# Patient Record
Sex: FEMALE | Race: AMERICAN INDIAN OR ALASKA NATIVE
[De-identification: names, ages, dates, MRNs, and addresses within clinical notes are randomized per-mention and may not be internally consistent; named-entity substitution may affect disease eponyms.]

---

## 2017-01-15 NOTE — XRAY REPORT
AP CHEST:



HISTORY: Cough



AP view of the chest demonstrates a normal mediastinal and

cardiac contour with clear lungs and normal bony and soft tissue

structures.



IMPRESSION:

Unremarkable AP chest. No significant change since 4/14/16.

## 2017-01-15 NOTE — EMERGENCY DEPARTMENT REPORT
ED General Adult HPI





- General


Chief complaint: Adult Asthma


Stated complaint: PEGGY/ASTHMA


Time Seen by Provider: 01/15/17 06:51


Source: patient


Mode of arrival: Wheelchair


Limitations: No Limitations





- History of Present Illness


Initial comments: 


She presents for persistent wheezing.  She states she has run out of her 

medicine.  She also states that she was given a prescription for a Z-Giorgio on a 

recent emergency department visit.  She does not smoke.  She's had no fever or 

chills.  She states her chest is occasionally sore with coughing.  She denies 

pleuritic pain.  She denies hemoptysis.  She states that she does produce 

yellow sputum.  She's had no leg swelling or pain.





-: Gradual, week(s)


Location: chest (only on cough)


Radiation: non-radiation


Quality: other


Consistency: intermittent, now resolved


Improves with: none


Worsens with: other (only on cough)


Associated Symptoms: denies other symptoms





- Related Data


 Home Medications











 Medication  Instructions  Recorded  Confirmed  Last Taken


 


Fluticasone/Salmeterol [Advair 1 puff IH BID 12/19/15 01/15/17 04/13/16





Diskus 500-50 mcg]    


 


Ipratropium Bromide [Atrovent Hfa] 12.9 gm IH BID PRN 12/19/15 01/15/17 04/13/16


 


Montelukast [Singulair] 10 mg PO QPM 12/19/15 01/15/17 04/13/16








 Previous Rx's











 Medication  Instructions  Recorded  Last Taken  Type


 


ALBUTEROL Inhaler [ProAir HFA 2 puff IH QID PRN #1 inha 01/30/16 04/13/16 Rx





Inhaler]    


 


Ipratropium [Atrovent NEB] 0.5 mg IH Q4HR PRN #20 nebu 04/09/16 04/13/16 Rx


 


Nebulizer Accessories [Aeroneb Go] 1 each  QID #1 each 04/09/16 04/13/16 Rx


 


Nebulizer [Minicomp Compressor 1 each  QID #1 each 04/09/16 04/13/16 Rx





Nebulizer]    


 


ALBUTEROL Inhaler [Proair] 2 puff IH QID PRN #1 inhalation 01/15/17 Unknown Rx


 


Albuterol Sulfate [Albuterol 0.63% 0.63 mg IH TID PRN #3 ml 01/15/17 Unknown Rx





NEBS]    


 


Fluticasone/Salmeterol [Advair 1 puff IH BID #1 disk.w.dev 01/15/17 Unknown Rx





Diskus 250-50 mcg]    


 


Sulfamethoxazole/Trimethoprim 1 each PO BID #10 tablet 01/15/17 Unknown Rx





[Bactrim DS TAB]    


 


predniSONE [Deltasone] 40 mg PO QDAY #14 tab 01/15/17 Unknown Rx











 Allergies











Allergy/AdvReac Type Severity Reaction Status Date / Time


 


methylprednisolone sodium Allergy  Hives Verified 04/14/16 07:55





succinate     





[From Solu-Medrol]     


 


moxifloxacin HCl Allergy  Hives Verified 04/14/16 07:55





[From Avelox]     














ED Review of Systems


ROS: 


Stated complaint: PEGGY/ASTHMA


Other details as noted in HPI





Constitutional: denies: chills, fever


Eyes: denies: eye pain, eye discharge, vision change


ENT: denies: ear pain, throat pain


Respiratory: cough, wheezing.  denies: shortness of breath


Cardiovascular: as per HPI, chest pain.  denies: palpitations


Endocrine: no symptoms reported


Gastrointestinal: denies: abdominal pain, nausea, diarrhea


Genitourinary: denies: urgency, dysuria, discharge


Musculoskeletal: denies: back pain, joint swelling, arthralgia


Skin: denies: rash, lesions


Neurological: denies: headache, weakness, paresthesias


Psychiatric: denies: anxiety, depression


Hematological/Lymphatic: denies: easy bleeding, easy bruising





ED Past Medical Hx





- Past Medical History


Previous Medical History?: Yes


Hx Hypertension: No


Hx Congestive Heart Failure: No


Hx Diabetes: No


Hx Sickle Cell Disease: No


Hx Arthritis:  (intubated x 4)


Hx Seizures: No


Hx Asthma: Yes


Hx COPD: No


Hx HIV: No


Additional medical history: OBESITY.  HERNIA X 2





- Surgical History


Past Surgical History?: Yes


Hx Cholecystectomy: Yes


Additional Surgical History: HERNIA SURGERY; C SECTION





- Social History


Smoking Status: Unknown if ever smoked


Substance Use Type: None





- Medications


Home Medications: 


 Home Medications











 Medication  Instructions  Recorded  Confirmed  Last Taken  Type


 


Fluticasone/Salmeterol [Advair 1 puff IH BID 12/19/15 01/15/17 04/13/16 History





Diskus 500-50 mcg]     


 


Ipratropium Bromide [Atrovent Hfa] 12.9 gm IH BID PRN 12/19/15 01/15/17 04/13/

16 History


 


Montelukast [Singulair] 10 mg PO QPM 12/19/15 01/15/17 04/13/16 History


 


ALBUTEROL Inhaler [ProAir HFA 2 puff IH QID PRN #1 inha 01/30/16 01/15/17 04/13/

16 Rx





Inhaler]     


 


Ipratropium [Atrovent NEB] 0.5 mg IH Q4HR PRN #20 nebu 04/09/16 01/15/17 04/13/

16 Rx


 


Nebulizer Accessories [Aeroneb Go] 1 each  QID #1 each 04/09/16 01/15/17 04/13

/16 Rx


 


Nebulizer [Minicomp Compressor 1 each  QID #1 each 04/09/16 01/15/17 04/13/16 

Rx





Nebulizer]     


 


ALBUTEROL Inhaler [Proair] 2 puff IH QID PRN #1 inhalation 01/15/17  Unknown Rx


 


Albuterol Sulfate [Albuterol 0.63% 0.63 mg IH TID PRN #3 ml 01/15/17  Unknown Rx





NEBS]     


 


Fluticasone/Salmeterol [Advair 1 puff IH BID #1 disk.w.dev 01/15/17  Unknown Rx





Diskus 250-50 mcg]     


 


Sulfamethoxazole/Trimethoprim 1 each PO BID #10 tablet 01/15/17  Unknown Rx





[Bactrim DS TAB]     


 


predniSONE [Deltasone] 40 mg PO QDAY #14 tab 01/15/17  Unknown Rx














ED Physical Exam





- General


Limitations: No Limitations


General appearance: alert, in no apparent distress





- Head


Head exam: Present: atraumatic, normocephalic





- Eye


Eye exam: Present: normal appearance.  Absent: scleral icterus





- ENT


ENT exam: Present: mucous membranes moist





- Neck


Neck exam: Present: normal inspection





- Respiratory


Respiratory exam: Present: normal lung sounds bilaterally.  Absent: respiratory 

distress





- Cardiovascular


Cardiovascular Exam: Present: regular rate, normal rhythm.  Absent: systolic 

murmur, diastolic murmur, rubs, gallop





- GI/Abdominal


GI/Abdominal exam: Present: soft, normal bowel sounds





- Extremities Exam


Extremities exam: Present: normal inspection





- Back Exam


Back exam: Present: normal inspection





- Neurological Exam


Neurological exam: Present: alert, oriented X3





- Psychiatric


Psychiatric exam: Present: normal affect, normal mood





- Skin


Skin exam: Present: warm, dry, intact, normal color.  Absent: rash





ED Course





 Vital Signs











  01/15/17 01/15/17 01/15/17





  05:42 06:08 06:22


 


Temperature 99.5 F 99.5 F 


 


Pulse Rate 99 H 90 


 


Pulse Rate [   





Bilateral]   


 


Respiratory 26 H 22 22





Rate   


 


Respiratory   





Rate [Bilateral   





]   


 


Blood Pressure 138/95  


 


Blood Pressure  146/88 





[Right]   


 


O2 Sat by Pulse 100 96 96





Oximetry   














  01/15/17 01/15/17 01/15/17





  07:10 07:25 07:28


 


Temperature   


 


Pulse Rate   


 


Pulse Rate [ 91 H 88 84





Bilateral]   


 


Respiratory   





Rate   


 


Respiratory 20 19 18





Rate [Bilateral   





]   


 


Blood Pressure   


 


Blood Pressure   





[Right]   


 


O2 Sat by Pulse   





Oximetry   














  01/15/17





  07:35


 


Temperature 


 


Pulse Rate 


 


Pulse Rate [ 91 H





Bilateral] 


 


Respiratory 





Rate 


 


Respiratory 18





Rate [Bilateral 





] 


 


Blood Pressure 


 


Blood Pressure 





[Right] 


 


O2 Sat by Pulse 





Oximetry 














- Reevaluation(s)


Reevaluation #1: 


Given nebs prior to my encounter.  The patient had no recurrent wheezing.  She 

has some slight residual rhonchi.  However her sat was good.  She desired 

discharge and was released in stable condition.  We over her chronic management 

and return criteria discussed.


01/15/17 10:19








ED Medical Decision Making





- Lab Data


See Accu-Chek result








- Radiology Data


interpreted by me: 


Chest x-ray no acute process





Critical care attestation.: 


If time is entered above; I have spent that time in minutes in the direct care 

of this critically ill patient, excluding procedure time.








ED Disposition


Clinical Impression: 


 Asthma exacerbation





Chronic bronchitis


Qualifiers:


 Chronic bronchitis type: unspecified Qualified Code(s): J42 - Unspecified 

chronic bronchitis


Disposition: DISCHARGED TO HOME OR SELFCARE


Is pt being admited?: No


Does the pt Need Aspirin: No


Condition: Stable


Instructions:  Asthma (ED), Chronic Bronchitis (ED)


Prescriptions: 


ALBUTEROL Inhaler [Proair] 2 puff IH QID PRN #1 inhalation


 PRN Reason: Shortness Of Breath


Albuterol Sulfate [Albuterol 0.63% NEBS] 0.63 mg IH TID PRN #3 ml


 PRN Reason: Wheezing


Fluticasone/Salmeterol [Advair Diskus 250-50 mcg] 1 puff IH BID #1 disk.w.dev


Sulfamethoxazole/Trimethoprim [Bactrim DS TAB] 1 each PO BID #10 tablet


predniSONE [Deltasone] 40 mg PO QDAY #14 tab


Referrals: 


[facility] [Provider Group] - 3-5 Days


PRIMARY CARE,MD [Primary Care Provider] - 2-3 Days


Time of Disposition: 10:21

## 2018-04-08 ENCOUNTER — HOSPITAL ENCOUNTER (INPATIENT)
Dept: HOSPITAL 5 - ED | Age: 43
LOS: 4 days | Discharge: HOME | DRG: 353 | End: 2018-04-12
Attending: INTERNAL MEDICINE | Admitting: INTERNAL MEDICINE
Payer: COMMERCIAL

## 2018-04-08 DIAGNOSIS — R65.10: ICD-10-CM

## 2018-04-08 DIAGNOSIS — Z90.49: ICD-10-CM

## 2018-04-08 DIAGNOSIS — E88.81: ICD-10-CM

## 2018-04-08 DIAGNOSIS — J45.901: ICD-10-CM

## 2018-04-08 DIAGNOSIS — K43.6: Primary | ICD-10-CM

## 2018-04-08 DIAGNOSIS — Z82.49: ICD-10-CM

## 2018-04-08 DIAGNOSIS — E66.9: ICD-10-CM

## 2018-04-08 DIAGNOSIS — K21.9: ICD-10-CM

## 2018-04-08 DIAGNOSIS — Z79.899: ICD-10-CM

## 2018-04-08 DIAGNOSIS — Z91.19: ICD-10-CM

## 2018-04-08 DIAGNOSIS — D64.9: ICD-10-CM

## 2018-04-08 DIAGNOSIS — K42.0: ICD-10-CM

## 2018-04-08 DIAGNOSIS — J96.00: ICD-10-CM

## 2018-04-08 DIAGNOSIS — M19.90: ICD-10-CM

## 2018-04-08 LAB
ALBUMIN SERPL-MCNC: 3.5 G/DL (ref 3.9–5)
ALT SERPL-CCNC: 18 UNITS/L (ref 7–56)
BACTERIA #/AREA URNS HPF: (no result) /HPF
BASOPHILS # (AUTO): 0 K/MM3 (ref 0–0.1)
BASOPHILS NFR BLD AUTO: 0.1 % (ref 0–1.8)
BILIRUB UR QL STRIP: (no result)
BLOOD UR QL VISUAL: (no result)
BUN SERPL-MCNC: 9 MG/DL (ref 7–17)
BUN/CREAT SERPL: 13 %
CALCIUM SERPL-MCNC: 8.3 MG/DL (ref 8.4–10.2)
EOSINOPHIL # BLD AUTO: 0.5 K/MM3 (ref 0–0.4)
EOSINOPHIL NFR BLD AUTO: 4.4 % (ref 0–4.3)
HCT VFR BLD CALC: 31.7 % (ref 30.3–42.9)
HEMOLYSIS INDEX: 0
HGB BLD-MCNC: 9.8 GM/DL (ref 10.1–14.3)
LIPASE SERPL-CCNC: 11 UNITS/L (ref 13–60)
LYMPHOCYTES # BLD AUTO: 1.1 K/MM3 (ref 1.2–5.4)
LYMPHOCYTES NFR BLD AUTO: 9.8 % (ref 13.4–35)
MCH RBC QN AUTO: 22 PG (ref 28–32)
MCHC RBC AUTO-ENTMCNC: 31 % (ref 30–34)
MCV RBC AUTO: 70 FL (ref 79–97)
MONOCYTES # (AUTO): 0.7 K/MM3 (ref 0–0.8)
MONOCYTES % (AUTO): 6.5 % (ref 0–7.3)
PH UR STRIP: 8 [PH] (ref 5–7)
PLATELET # BLD: 147 K/MM3 (ref 140–440)
PROT UR STRIP-MCNC: (no result) MG/DL
RBC # BLD AUTO: 4.51 M/MM3 (ref 3.65–5.03)
RBC #/AREA URNS HPF: 2 /HPF (ref 0–6)
UROBILINOGEN UR-MCNC: < 2 MG/DL (ref ?–2)
WBC #/AREA URNS HPF: 2 /HPF (ref 0–6)

## 2018-04-08 PROCEDURE — 96375 TX/PRO/DX INJ NEW DRUG ADDON: CPT

## 2018-04-08 PROCEDURE — C1781 MESH (IMPLANTABLE): HCPCS

## 2018-04-08 PROCEDURE — 74176 CT ABD & PELVIS W/O CONTRAST: CPT

## 2018-04-08 PROCEDURE — 36415 COLL VENOUS BLD VENIPUNCTURE: CPT

## 2018-04-08 PROCEDURE — 83690 ASSAY OF LIPASE: CPT

## 2018-04-08 PROCEDURE — 94640 AIRWAY INHALATION TREATMENT: CPT

## 2018-04-08 PROCEDURE — 80048 BASIC METABOLIC PNL TOTAL CA: CPT

## 2018-04-08 PROCEDURE — 85025 COMPLETE CBC W/AUTO DIFF WBC: CPT

## 2018-04-08 PROCEDURE — 80053 COMPREHEN METABOLIC PANEL: CPT

## 2018-04-08 PROCEDURE — 87040 BLOOD CULTURE FOR BACTERIA: CPT

## 2018-04-08 PROCEDURE — 96374 THER/PROPH/DIAG INJ IV PUSH: CPT

## 2018-04-08 PROCEDURE — 94760 N-INVAS EAR/PLS OXIMETRY 1: CPT

## 2018-04-08 PROCEDURE — 81001 URINALYSIS AUTO W/SCOPE: CPT

## 2018-04-08 PROCEDURE — C9113 INJ PANTOPRAZOLE SODIUM, VIA: HCPCS

## 2018-04-08 PROCEDURE — 71045 X-RAY EXAM CHEST 1 VIEW: CPT

## 2018-04-08 RX ADMIN — MONTELUKAST SCH MG: 10 TABLET, FILM COATED ORAL at 18:40

## 2018-04-08 RX ADMIN — BUDESONIDE SCH MG: 0.5 INHALANT RESPIRATORY (INHALATION) at 20:07

## 2018-04-08 RX ADMIN — OXYCODONE AND ACETAMINOPHEN PRN TAB: 5; 325 TABLET ORAL at 18:47

## 2018-04-08 RX ADMIN — ENOXAPARIN SODIUM SCH MG: 100 INJECTION SUBCUTANEOUS at 18:14

## 2018-04-08 NOTE — CAT SCAN REPORT
FINAL REPORT



PROCEDURE:  CT ABDOMEN PELVIS WO CON



TECHNIQUE:  Computerized axial tomography of the abdomen and

pelvis was performed without intravenous contrast. This study is

performed without intravascular contrast material and its

sensitivity for abdominal and pelvic pathology, including

neoplasms, inflammation, abscess, free fluid, thrombosis,

arterial dissection and infarction, is reduced compared with a

contrast enhanced study. 



HISTORY:  abd pain 



COMPARISON:  No prior studies are available for comparison.



FINDINGS:  

Lower Lung fields: There are minimal linear bands of atelectasis

visualized. Lung bases otherwise are.



Upper Abdomen: Moderate size hiatal hernia is present. The liver

showed no focal abnormalities. The gallbladder is surgically

absent. The adrenal glands, the pancreas and the spleen are

unremarkable. 



Kidneys, Ureters and Urinary bladder: There are 2 nonobstructing

calculi in the upper half of the right kidney, 1 measures 1.4 x

1.0 centimeter the other 4.7 millimeters. The right kidney and

right ureter otherwise are unremarkable. Left kidney left ureter

and urinary bladder show no focal abnormalities.



Retroperitoneum: Atherosclerotic changes are seen in the

abdominal aorta.  No aneurysm is visualized.



Nonspecific subcentimeter lymph nodes are seen in the

retroperitoneum. No pathologically enlarged lymph nodes are

identified. 



Bowel: Mild diverticulosis is seen in the sigmoid colon without

evidence of diverticulitis. Mild diverticulosis also seen in the

transverse colon without evidence of diverticulitis. The appendix

is not visualized. No evidence of bowel obstruction ascites or

free intraperitoneal gas. Anterior pelvic wall hernia visualized

with at least 2 orifice both containing adipose tissue and

portions of loops of small bowel.



Reproductive organs: Uterus is deviated to the right of midline

otherwise unremarkable. No abnormal adnexal masses are

identified.



Other: No acute bony abnormalities are seen.



IMPRESSION:  

Moderate size hiatal hernia present.



Moderate-sized anterior pelvic wall hernia with at least 2

orifice containing adipose tissue and loops of small bowel

without obstruction..



Prior cholecystectomy. 



Nonobstructing renal calculi right kidney as described.

## 2018-04-08 NOTE — EMERGENCY DEPARTMENT REPORT
ED Abdominal Pain HPI





- General


Chief Complaint: Abdominal Pain


Stated Complaint: ABD PAIN


Time Seen by Provider: 04/08/18 12:55


Source: EMS


Mode of arrival: Stretcher


Limitations: No Limitations





- History of Present Illness


Initial Comments: 





Patient is a 43-year-old female presents to emergency room with nausea vomiting 

diarrhea and abdominal pain 1 day.  Patient states the pain is worsening.  

Patient states that the abdominal pain is umbilical and radiates to her left 

and right side of her abdomen.  Patient states she feels feverish at times. 

patient denies chest pain or shortness of breath.  Patient complains of a 

worsening cough that started 2 days ago.  Patient states she has asthma and her 

asthma is worsening.  Patient states the abdominal pain is worse with movement 

and palpation.  Patient states the abdominal pain is better with rest and lying 

still.  Patient states that her cough is better with her asthma medications and 

sitting up upright.  Patient is currently at anchor on a 1013 and has her 

sitter at bedside


MD Complaint: abdominal pain


-: Sudden


Location: periumbilical


Severity scale (0 -10): 10


Quality: cramping, stabbing


Consistency: constant


Improves With: rest


Worsens With: movement


Associated Symptoms: denies other symptoms, nausea, vomiting, diarrhea.  denies

: fever, chills, constipation, dysuria, hematemesis, hematochezia, melena, 

hematuria, anorexia, syncope





- Related Data


 Home Medications











 Medication  Instructions  Recorded  Confirmed  Last Taken


 


ALPRAZolam [Xanax] 1 mg PO BID 04/08/18 04/08/18 Unknown


 


ARIPiprazole [Abilify] 5 mg PO BID 04/08/18 04/08/18 Unknown


 


Furosemide [Lasix] 20 mg PO QDAY 04/08/18 04/08/18 Unknown


 


HYDROcodone/APAP 5-325 [Norco 1 each PO TID 04/08/18 04/08/18 Unknown





5/325]    


 


Nitrofurantoin Macrocrystal 100 mg PO BID 04/08/18 04/08/18 Unknown





[Macrodantin]    


 


Omeprazole 40 mg PO QAM 04/08/18 04/08/18 Unknown


 


buPROPion XL [Wellbutrin XL] 150 mg PO QAM 04/08/18 04/08/18 Unknown








 Previous Rx's











 Medication  Instructions  Recorded  Last Taken  Type


 


Fluticasone/Salmeterol [Advair 1 puff IH BID #1 disk.w.dev 01/15/17 Unknown Rx





Diskus 250-50 mcg]    











 Allergies











Allergy/AdvReac Type Severity Reaction Status Date / Time


 


methylprednisolone sodium Allergy  Hives Verified 04/14/16 07:55





succinate     





[From Solu-Medrol]     


 


moxifloxacin HCl Allergy  Hives Verified 04/14/16 07:55





[From Avelox]     














ED Review of Systems


ROS: 


Stated complaint: ABD PAIN


Other details as noted in HPI





Comment: All other systems reviewed and negative


Constitutional: fever.  denies: chills


Eyes: denies: eye pain, eye discharge, vision change


ENT: denies: ear pain, throat pain


Respiratory: cough, wheezing.  denies: shortness of breath


Cardiovascular: denies: chest pain, palpitations


Endocrine: no symptoms reported


Gastrointestinal: abdominal pain, nausea, diarrhea.  denies: constipation, 

hematemesis, melena


Genitourinary: denies: urgency, dysuria, discharge


Musculoskeletal: denies: back pain, joint swelling, arthralgia


Skin: denies: rash, lesions


Neurological: denies: headache, weakness, paresthesias


Psychiatric: denies: anxiety, depression


Hematological/Lymphatic: denies: easy bleeding, easy bruising





ED Past Medical Hx





- Past Medical History


Previous Medical History?: Yes


Hx Hypertension: No


Hx Congestive Heart Failure: No


Hx Diabetes: No


Hx Sickle Cell Disease: No


Hx Arthritis:  (intubated x 4)


Hx Seizures: No


Hx Asthma: Yes


Hx COPD: No


Hx HIV: No


Additional medical history: OBESITY.  HERNIA X 2





- Surgical History


Past Surgical History?: Yes


Hx Cholecystectomy: Yes


Additional Surgical History: HERNIA SURGERY; C SECTION





- Family History


Family history: hypertension





- Social History


Smoking Status: Never Smoker


Substance Use Type: Alcohol





- Medications


Home Medications: 


 Home Medications











 Medication  Instructions  Recorded  Confirmed  Last Taken  Type


 


Fluticasone/Salmeterol [Advair 1 puff IH BID #1 disk.w.dev 01/15/17 04/08/18 

Unknown Rx





Diskus 250-50 mcg]     


 


ALPRAZolam [Xanax] 1 mg PO BID 04/08/18 04/08/18 Unknown History


 


ARIPiprazole [Abilify] 5 mg PO BID 04/08/18 04/08/18 Unknown History


 


Furosemide [Lasix] 20 mg PO QDAY 04/08/18 04/08/18 Unknown History


 


HYDROcodone/APAP 5-325 [Norco 1 each PO TID 04/08/18 04/08/18 Unknown History





5/325]     


 


Nitrofurantoin Macrocrystal 100 mg PO BID 04/08/18 04/08/18 Unknown History





[Macrodantin]     


 


Omeprazole 40 mg PO QAM 04/08/18 04/08/18 Unknown History


 


buPROPion XL [Wellbutrin XL] 150 mg PO QAM 04/08/18 04/08/18 Unknown History














ED Physical Exam





- General


Limitations: No Limitations


General appearance: alert, in no apparent distress





- Head


Head exam: Present: atraumatic, normocephalic





- Eye


Eye exam: Present: normal appearance





- ENT


ENT exam: Present: mucous membranes moist





- Neck


Neck exam: Present: normal inspection





- Respiratory


Respiratory exam: Present: normal lung sounds bilaterally.  Absent: respiratory 

distress





- Cardiovascular


Cardiovascular Exam: Present: regular rate, normal rhythm.  Absent: systolic 

murmur, diastolic murmur, rubs, gallop





- GI/Abdominal


GI/Abdominal exam: Present: soft, tenderness (umbilical and periumbilical 

tenderness to palpation. ), normal bowel sounds, hernia (umbilical hernia noted 

and not reducible and extremely tender to palpation)





- Extremities Exam


Extremities exam: Present: normal inspection





- Back Exam


Back exam: Present: normal inspection





- Neurological Exam


Neurological exam: Present: alert, oriented X3





- Psychiatric


Psychiatric exam: Present: normal affect, normal mood





- Skin


Skin exam: Present: warm, dry, intact, normal color.  Absent: rash





ED Course


 Vital Signs











  04/08/18 04/08/18 04/08/18





  12:29 12:32 12:45


 


Temperature 99.3 F  


 


Pulse Rate 97 H  


 


Respiratory   





Rate   


 


Blood Pressure 126/64 123/74 114/72


 


O2 Sat by Pulse 96  96





Oximetry   














  04/08/18 04/08/18 04/08/18





  13:00 13:15 13:31


 


Temperature   


 


Pulse Rate   92 H


 


Respiratory   15





Rate   


 


Blood Pressure 110/72 107/69 114/72


 


O2 Sat by Pulse 92 93 95





Oximetry   














  04/08/18 04/08/18 04/08/18





  13:45 13:55 14:00


 


Temperature   


 


Pulse Rate 94 H  89


 


Respiratory 24 33 H 32 H





Rate   


 


Blood Pressure 114/72  103/57


 


O2 Sat by Pulse 96 98 93





Oximetry   














  04/08/18 04/08/18 04/08/18





  15:01 16:00 16:42


 


Temperature   


 


Pulse Rate   84


 


Respiratory   





Rate   


 


Blood Pressure 103/57 108/79 


 


O2 Sat by Pulse 98 95 





Oximetry   














  04/08/18 04/08/18





  17:00 18:00


 


Temperature  


 


Pulse Rate  


 


Respiratory  





Rate  


 


Blood Pressure 102/48 116/74


 


O2 Sat by Pulse 87 98





Oximetry  














- Reevaluation(s)


Reevaluation #1: 


Dr Sexton consulted for non-reducible umbilica hernia and abd pain. Dr Sexton 

recommends admission to the hospitalist and have the hospitalist consult her 

for evaluation and treatment.


04/08/18 15:34





Reevaluation #2: 


Hospitalist consulted for admission.  All results discussed with the patient 

and patient agree with plan of care.


04/08/18 15:37











Reevaluation #3: 


1013 signed due to the fact that the patient is currently at a psych facility 

for suicidal ideations and treatment


04/08/18 17:00








ED Medical Decision Making





- Lab Data


Result diagrams: 


 04/08/18 13:05





 04/08/18 13:05


Critical care attestation.: 


If time is entered above; I have spent that time in minutes in the direct care 

of this critically ill patient, excluding procedure time.





Critical Care Time: 


45 minutes spent for critical care time








ED Disposition


Clinical Impression: 


 Incarcerated umbilical hernia, Anemia, Abdominal pain, Cough, Asthma 

exacerbation





Disposition: [id number] OP ADMIT IP TO THIS HOSP


Is pt being admited?: Yes


Does the pt Need Aspirin: No


Time of Disposition: 15:45

## 2018-04-08 NOTE — XRAY REPORT
FINAL REPORT



EXAM:  XR CHEST 1V AP



HISTORY:  cough 



TECHNIQUE:  upright single view chest



PRIORS:  None.



FINDINGS:  

Cardiac and mediastinal contours are unremarkable.  No focal

pulmonary infiltrate is identified.  No pleural fluid collection

seen. Pulmonary vasculature is unremarkable. 



IMPRESSION:  

Negative single-view chest

## 2018-04-08 NOTE — HISTORY AND PHYSICAL REPORT
History of Present Illness


Chief complaint: 





My stomach hurts





History of present illness: 


44 YO Female with Asthma, Obesity Hypoventillation Syndrome presents to ED for 

evaluation. Pt states that she has experienced Abdominal pain for the past 1 

week, with worsening symptoms over the past 1 day. Pt acknowledges Nausea, 

Vomiting, and multiple episodes of loose stools with bloody bowel movements as 

well as a productive cough of yellow sputum. Pt denies BRBPR, CP, Palpitations, 

Syncope, Productive cough, unintentional weight loss, or night sweats. 


Pt seen and evaluated in ED and found to have incarcerated ventral hernia 

complicated by SIRS, as well as respiratory failure secondary to obesity 

hypoventillation. Surgery team consulted in ED. Pt admitted to medical floor. 





Past History


Past Surgical History: cholecystectomy, , hernia repair


Social history: single.  denies: smoking, alcohol abuse, prescription drug abuse


Family history: hypertension





Medications and Allergies


 Allergies











Allergy/AdvReac Type Severity Reaction Status Date / Time


 


methylprednisolone sodium Allergy  Hives Verified 16 07:55





succinate     





[From Solu-Medrol]     


 


moxifloxacin HCl Allergy  Hives Verified 16 07:55





[From Avelox]     











 Home Medications











 Medication  Instructions  Recorded  Confirmed  Last Taken  Type


 


Fluticasone/Salmeterol [Advair 1 puff IH BID #1 disk.w.dev 01/15/17 04/08/18 

Unknown Rx





Diskus 250-50 mcg]     


 


ALPRAZolam [Xanax] 1 mg PO BID 18 Unknown History


 


ARIPiprazole [Abilify] 5 mg PO BID 18 Unknown History


 


Furosemide [Lasix] 20 mg PO QDAY 18 Unknown History


 


HYDROcodone/APAP 5-325 [Norco 1 each PO TID 18 Unknown History





5/325]     


 


Nitrofurantoin Macrocrystal 100 mg PO BID 18 Unknown History





[Macrodantin]     


 


Omeprazole 40 mg PO QAM 18 Unknown History


 


buPROPion XL [Wellbutrin XL] 150 mg PO QAM 18 Unknown History














Review of Systems


Constitutional: fever, no weight loss, no weight gain, no chills, no sweats


Ears, nose, mouth and throat: no ear pain, no ear discharge, no tinnitis, no 

decreased hearing, no nose pain, no nasal congestion, no nasal discharge


Breasts: no change in shape, no swelling, no mass


Cardiovascular: no chest pain, no orthopnea, no palpitations, no rapid/

irregular heart beat


Respiratory: cough with sputum, shortness of breath


Gastrointestinal: abdominal pain, nausea, vomiting, diarrhea, BRBPR, no melena, 

no hematochezia, no loss of appetite, no early satiety


Genitourinary Female: no dysmenorrhea, no pelvic pain, no menorrhagia, no 

dysuria, no urinary frequency


Rectal: no pain, no incontinence, no bleeding


Musculoskeletal: no neck stiffness, no neck pain, no shooting arm pain, no arm 

numbness/tingling, no low back pain, no shooting leg pain, no leg numbness/

tingling, no redness of joints


Integumentary: no rash, no pruritis, no redness, no sores, no wounds, no 

jaundice


Neurological: no head injury, no transient paralysis, no paralysis, no weakness

, no parathesias, no numbness, no tingling, no seizures, no syncope


Psychiatric: no anxiety, no memory loss, no change in sleep habits, no sleep 

disturbances, no insomnia, no hypersomnia, no change in appetite, no change in 

libido, no suicidal ideation


Endocrine: no cold intolerance, no heat intolerance, no polyphagia, no 

excessive thirst, no polydipsia, no polyuria, no nocturia, no excessive sweating


Hematologic/Lymphatic: no easy bruising, no easy bleeding, no lymphadenopathy, 

no lymphedema


Allergic/Immunologic: no urticaria, no allergic rhinitis, no wheezing, no 

persistent infections, no anaphylaxis





Exam





- Constitutional


Vitals: 


 











Temp Pulse Resp BP Pulse Ox


 


 99.3 F   89   32 H  103/57   93 


 


 18 12:29  18 14:00  18 14:00  18 14:00  18 14:00











General appearance: Present: mild distress, obese





- EENT


Eyes: Present: PERRL


ENT: hearing intact, clear oral mucosa





- Neck


Neck: Present: supple, normal ROM





- Respiratory


Respiratory effort: normal


Respiratory: bilateral: diminished, wheezing





- Cardiovascular


Heart Sounds: Present: S1 & S2.  Absent: rub, click





- Extremities


Extremities: pulses symmetrical, No edema


Peripheral Pulses: within normal limits





- Abdominal


General gastrointestinal: Present: soft, tender, non-distended


Localized gastrointestinal: tender: epigastric periumbilical





- Rectal


Rectal Exam: normal exam-external/orifice





- Integumentary


Integumentary: Present: clear, warm, dry





- Musculoskeletal


Musculoskeletal: gait normal, strength equal bilaterally





- Psychiatric


Psychiatric: appropriate mood/affect, intact judgment & insight





- Neurologic


Neurologic: CNII-XII intact, moves all extremities





Results





- Labs


CBC & Chem 7: 


 18 13:05





 18 13:05


Labs: 


 Abnormal lab results











  18 Range/Units





  13:05 13:05 13:16 


 


WBC  11.3 H    (4.5-11.0)  K/mm3


 


Hgb  9.8 L    (10.1-14.3)  gm/dl


 


MCV  70 L    (79-97)  fl


 


MCH  22 L    (28-32)  pg


 


RDW  16.4 H    (13.2-15.2)  %


 


Lymph % (Auto)  9.8 L    (13.4-35.0)  %


 


Eos % (Auto)  4.4 H    (0.0-4.3)  %


 


Lymph #  1.1 L    (1.2-5.4)  K/mm3


 


Eos #  0.5 H    (0.0-0.4)  K/mm3


 


Seg Neutrophils %  79.2 H    (40.0-70.0)  %


 


Seg Neutrophils #  9.0 H    (1.8-7.7)  K/mm3


 


Chloride   95.2 L   ()  mmol/L


 


Glucose   133 H   ()  mg/dL


 


Calcium   8.3 L   (8.4-10.2)  mg/dL


 


Albumin   3.5 L   (3.9-5)  g/dL


 


Lipase   11 L   (13-60)  units/L


 


Urine pH    8.0 H  (5.0-7.0)  














Assessment and Plan





- Patient Problems


(1) SIRS (systemic inflammatory response syndrome)


Current Visit: Yes   Status: Acute   


Plan to address problem: 


IV antibiotics, Chest X ray, UTI, CT abdomen pelvis, supportive care. 








(2) Umbilical hernia, incarcerated


Current Visit: Yes   Status: Acute   


Plan to address problem: 


Surgery consulted, NPO after midnight, possible surgical intervention in AM. 








(3) Respiratory failure


Current Visit: Yes   Status: Acute   


Qualifiers: 


   Chronicity: acute   Respiratory failure complication: hypoxia   Qualified 

Code(s): J96.01 - Acute respiratory failure with hypoxia   


Plan to address problem: 


Suplemental oxygen, nebulizer therapy, aspiration precautions, incentive 

spirometry, NIPPV as clinically indicated. 








(4) Asthma exacerbation


Current Visit: Yes   Status: Acute   


Plan to address problem: 


supplemental oxygen, nebulizer therapy, inhaled steroids, IV antibiotics, pulse 

oximetry








(5) DVT prophylaxis


Current Visit: Yes   Status: Acute   


Plan to address problem: 


SCD to BLE while in bed,

## 2018-04-08 NOTE — CONSULTATION
History of Present Illness


Consult date: 18


Chief complaint: 





abd pain, hernia





- History of present illness


History of present illness: 





44 yo F with hx of asthma, COPD, obesity presents to ER from psychiatric 

facility for abdominal pain. She states she has had a left sided abdominal 

hernia for 1 year. This has started to cause her pain for the last several 

days. She is on lortab for the chronic pain but this has not been helping 

recently. The pain is constant, sharp, and radiates to the rest of the abdomen. 

She admits to n/v, and diarrhea. She states she notes blood in her BMs. She was 

able to have dinner last night. This exacerbated the pain. She states she has 

been having low grade fevers and was also recently diagnosed with a UTI for 

which she has been on bactrim for 2 days. At this time she denies n/v and is 

hungry. She denies cp, sob. She has a productive cough of yellow sputum. 





Patient has been in the psychiatric facility for the last week or so for 

suicidal ideations. Currently she does not admit to wanting to hurt herself.





Past History


Past Medical History: COPD, other (obesity, asthma, anxiety, suicidal ideations)


Past Surgical History: cholecystectomy, , Other (exlap for 

incarcerated hernia)


Social history: alcohol abuse (social).  denies: smoking


Family history: no significant family history





Medications and Allergies


 Allergies











Allergy/AdvReac Type Severity Reaction Status Date / Time


 


methylprednisolone sodium Allergy  Hives Verified 16 07:55





succinate     





[From Solu-Medrol]     


 


moxifloxacin HCl Allergy  Hives Verified 16 07:55





[From Avelox]     











 Home Medications











 Medication  Instructions  Recorded  Confirmed  Last Taken  Type


 


Fluticasone/Salmeterol [Advair 1 puff IH BID #1 disk.w.dev 01/15/17 04/08/18 

Unknown Rx





Diskus 250-50 mcg]     


 


ALPRAZolam [Xanax] 1 mg PO BID 18 Unknown History


 


ARIPiprazole [Abilify] 5 mg PO BID 18 Unknown History


 


Furosemide [Lasix] 20 mg PO QDAY 18 Unknown History


 


HYDROcodone/APAP 5-325 [Norco 1 each PO TID 18 Unknown History





5/325]     


 


Nitrofurantoin Macrocrystal 100 mg PO BID 18 Unknown History





[Macrodantin]     


 


Omeprazole 40 mg PO QAM 18 Unknown History


 


buPROPion XL [Wellbutrin XL] 150 mg PO QAM 18 Unknown History











Active Meds: 


Active Medications





Acetaminophen (Tylenol)  650 mg PO Q4H PRN


   PRN Reason: Pain MILD(1-3)/Fever >100.5/HA


Albuterol (Proventil)  2.5 mg IH Q4HRT PRN


   PRN Reason: Shortness Of Breath


Budesonide (Pulmicort)  0.5 mg IH Q12HRT NIKA


Azithromycin 500 mg/ Sodium (Chloride)  250 mls @ 250 mls/hr IV Q24H NIKA


Methylprednisolone Sodium Succinate (Solu-Medrol)  40 mg IV Q12H NIKA


Methylprednisolone Sodium Succinate (Solu-Medrol)  125 mg IV ONCE ONE


   Stop: 18 17:01


Montelukast Sodium (Singulair)  10 mg PO QPM [facility]


Ondansetron HCl (Zofran)  4 mg IV Q8H PRN


   PRN Reason: Nausea And Vomiting


Oxycodone/Acetaminophen (Percocet 5/325)  1 tab PO Q6H PRN


   PRN Reason: Pain, Moderate (4-6)


Sodium Chloride (Sodium Chloride Flush Syringe 10 Ml)  10 ml IV BID [facility]


Sodium Chloride (Sodium Chloride Flush Syringe 10 Ml)  10 ml IV PRN PRN


   PRN Reason: LINE FLUSH











Review of Systems


All systems: negative (10 point ROS performed and negative except for listed 

above in HPI)





Exam


 Vital Signs











Temp Pulse BP Pulse Ox


 


 99.3 F   97 H  126/64   96 


 


 18 12:29  18 12:29  18 12:29  18 12:29











Narrative exam: 





Gen: AAOx3. NAD


CV: S1, S2+, no m/r/g


Resp: +expiratory wheezes b/l


Abd: soft, obese, generalized TTP. Left sided abdominal wall hernia soft, 

tender to palpation, no skin changes. Reducible but reoccurs. No r/r/g. Well 

healed surgical scar.


Ext: no c/c/e





Results





- Labs





 18 13:05





 18 13:05


 Abnormal lab results











  18 Range/Units





  13:05 13:05 13:16 


 


WBC  11.3 H    (4.5-11.0)  K/mm3


 


Hgb  9.8 L    (10.1-14.3)  gm/dl


 


MCV  70 L    (79-97)  fl


 


MCH  22 L    (28-32)  pg


 


RDW  16.4 H    (13.2-15.2)  %


 


Lymph % (Auto)  9.8 L    (13.4-35.0)  %


 


Eos % (Auto)  4.4 H    (0.0-4.3)  %


 


Lymph #  1.1 L    (1.2-5.4)  K/mm3


 


Eos #  0.5 H    (0.0-0.4)  K/mm3


 


Seg Neutrophils %  79.2 H    (40.0-70.0)  %


 


Seg Neutrophils #  9.0 H    (1.8-7.7)  K/mm3


 


Chloride   95.2 L   ()  mmol/L


 


Glucose   133 H   ()  mg/dL


 


Calcium   8.3 L   (8.4-10.2)  mg/dL


 


Albumin   3.5 L   (3.9-5)  g/dL


 


Lipase   11 L   (13-60)  units/L


 


Urine pH    8.0 H  (5.0-7.0)  








 Diabetes panel











  18 Range/Units





  13:05 


 


Sodium  137  (137-145)  mmol/L


 


Potassium  4.4  (3.6-5.0)  mmol/L


 


Chloride  95.2 L  ()  mmol/L


 


Carbon Dioxide  29  (22-30)  mmol/L


 


BUN  9  (7-17)  mg/dL


 


Creatinine  0.7  (0.7-1.2)  mg/dL


 


Glucose  133 H  ()  mg/dL


 


Calcium  8.3 L  (8.4-10.2)  mg/dL


 


AST  23  (5-40)  units/L


 


ALT  18  (7-56)  units/L


 


Alkaline Phosphatase  48  ()  units/L


 


Total Protein  6.5  (6.3-8.2)  g/dL


 


Albumin  3.5 L  (3.9-5)  g/dL








 Calcium panel











  18 Range/Units





  13:05 


 


Calcium  8.3 L  (8.4-10.2)  mg/dL


 


Albumin  3.5 L  (3.9-5)  g/dL








 Pituitary panel











  18 Range/Units





  13:05 


 


Sodium  137  (137-145)  mmol/L


 


Potassium  4.4  (3.6-5.0)  mmol/L


 


Chloride  95.2 L  ()  mmol/L


 


Carbon Dioxide  29  (22-30)  mmol/L


 


BUN  9  (7-17)  mg/dL


 


Creatinine  0.7  (0.7-1.2)  mg/dL


 


Glucose  133 H  ()  mg/dL


 


Calcium  8.3 L  (8.4-10.2)  mg/dL








 Adrenal panel











  18 Range/Units





  13:05 


 


Sodium  137  (137-145)  mmol/L


 


Potassium  4.4  (3.6-5.0)  mmol/L


 


Chloride  95.2 L  ()  mmol/L


 


Carbon Dioxide  29  (22-30)  mmol/L


 


BUN  9  (7-17)  mg/dL


 


Creatinine  0.7  (0.7-1.2)  mg/dL


 


Glucose  133 H  ()  mg/dL


 


Calcium  8.3 L  (8.4-10.2)  mg/dL


 


Total Bilirubin  0.60  (0.1-1.2)  mg/dL


 


AST  23  (5-40)  units/L


 


ALT  18  (7-56)  units/L


 


Alkaline Phosphatase  48  ()  units/L


 


Total Protein  6.5  (6.3-8.2)  g/dL


 


Albumin  3.5 L  (3.9-5)  g/dL














- Imaging


CT scan - abdomen: report reviewed, image reviewed


CT scan - pelvis: report reviewed, image reviewed





Assessment and Plan





44 yo F with chronically incarcerated ventral hernia, obesity





Plan:





1. ok to start diet, NPO p MN


2. CT scan reviewed and compared with CT from 2015. Patient has same ventral 

hernia containing loops of bowel. No signs of obstruction, bowel wall thickening

, or signs of bowel compromise on current CT A/P. Radiology report reviewed. 


3. repeat labs in am


4. gently IVF hydration


5. PRN pain and nausea control


6. nebs and pulm toilet


7. does not need standing abx from surgical standpoint


8. DVT ppx


9. will plan for OR tomorrow . I discussed this with the patient. 

Laparoscopic, possible open ventral hernia repair, with mesh. 





D/W Dr. Pierce

## 2018-04-09 PROCEDURE — 0WUF4JZ SUPPLEMENT ABDOMINAL WALL WITH SYNTHETIC SUBSTITUTE, PERCUTANEOUS ENDOSCOPIC APPROACH: ICD-10-PCS | Performed by: SURGERY

## 2018-04-09 PROCEDURE — 8E0W4CZ ROBOTIC ASSISTED PROCEDURE OF TRUNK REGION, PERCUTANEOUS ENDOSCOPIC APPROACH: ICD-10-PCS | Performed by: SURGERY

## 2018-04-09 RX ADMIN — METRONIDAZOLE SCH MLS/HR: 5 INJECTION, SOLUTION INTRAVENOUS at 23:34

## 2018-04-09 RX ADMIN — ARFORMOTEROL TARTRATE SCH: 15 SOLUTION RESPIRATORY (INHALATION) at 13:58

## 2018-04-09 RX ADMIN — ALPRAZOLAM SCH MG: 1 TABLET ORAL at 21:29

## 2018-04-09 RX ADMIN — ARFORMOTEROL TARTRATE SCH MCG: 15 SOLUTION RESPIRATORY (INHALATION) at 20:05

## 2018-04-09 RX ADMIN — BUDESONIDE SCH MG: 0.5 INHALANT RESPIRATORY (INHALATION) at 20:05

## 2018-04-09 RX ADMIN — ARIPIPRAZOLE SCH MG: 10 TABLET ORAL at 21:28

## 2018-04-09 RX ADMIN — HYDROCODONE BITARTRATE AND ACETAMINOPHEN SCH: 5; 325 TABLET ORAL at 22:06

## 2018-04-09 RX ADMIN — BUDESONIDE SCH MG: 0.5 INHALANT RESPIRATORY (INHALATION) at 07:42

## 2018-04-09 RX ADMIN — METRONIDAZOLE SCH MLS/HR: 5 INJECTION, SOLUTION INTRAVENOUS at 21:29

## 2018-04-09 RX ADMIN — ALPRAZOLAM SCH: 1 TABLET ORAL at 22:07

## 2018-04-09 RX ADMIN — SODIUM CHLORIDE, SODIUM LACTATE, POTASSIUM CHLORIDE, AND CALCIUM CHLORIDE SCH MLS/HR: .6; .31; .03; .02 INJECTION, SOLUTION INTRAVENOUS at 11:46

## 2018-04-09 RX ADMIN — Medication SCH ML: at 09:49

## 2018-04-09 RX ADMIN — ALBUTEROL SULFATE PRN MG: 2.5 SOLUTION RESPIRATORY (INHALATION) at 07:42

## 2018-04-09 RX ADMIN — BUPROPION HYDROCHLORIDE SCH: 150 TABLET, EXTENDED RELEASE ORAL at 22:08

## 2018-04-09 RX ADMIN — OXYCODONE AND ACETAMINOPHEN PRN TAB: 5; 325 TABLET ORAL at 09:09

## 2018-04-09 RX ADMIN — ENOXAPARIN SODIUM SCH MG: 100 INJECTION SUBCUTANEOUS at 09:48

## 2018-04-09 RX ADMIN — ARIPIPRAZOLE SCH: 10 TABLET ORAL at 22:04

## 2018-04-09 RX ADMIN — Medication SCH ML: at 23:44

## 2018-04-09 RX ADMIN — PANTOPRAZOLE SODIUM SCH: 40 INJECTION, POWDER, FOR SOLUTION INTRAVENOUS at 22:07

## 2018-04-09 RX ADMIN — MONTELUKAST SCH: 10 TABLET, FILM COATED ORAL at 22:06

## 2018-04-09 RX ADMIN — HYDROCODONE BITARTRATE AND ACETAMINOPHEN SCH EACH: 5; 325 TABLET ORAL at 21:29

## 2018-04-09 RX ADMIN — Medication SCH: at 00:32

## 2018-04-09 RX ADMIN — CEFAZOLIN SCH MLS/HR: 330 INJECTION, POWDER, FOR SOLUTION INTRAMUSCULAR; INTRAVENOUS at 23:43

## 2018-04-09 RX ADMIN — OXYCODONE AND ACETAMINOPHEN PRN TAB: 5; 325 TABLET ORAL at 00:25

## 2018-04-09 RX ADMIN — SODIUM CHLORIDE, SODIUM LACTATE, POTASSIUM CHLORIDE, AND CALCIUM CHLORIDE SCH MLS/HR: .6; .31; .03; .02 INJECTION, SOLUTION INTRAVENOUS at 21:43

## 2018-04-09 RX ADMIN — MORPHINE SULFATE PRN MG: 2 INJECTION, SOLUTION INTRAMUSCULAR; INTRAVENOUS at 23:47

## 2018-04-09 RX ADMIN — METRONIDAZOLE SCH MLS/HR: 5 INJECTION, SOLUTION INTRAVENOUS at 23:35

## 2018-04-09 NOTE — OPERATIVE REPORT
Operative Report


Operative Report: 


Date of surgery: 4/9/18


Preoperative diagnosis: incarcerated ventral hernia


Postoperative diagnosis: multiple ventral hernias, incarcerated ventral hernias


Procedure: Robotic assisted laparoscopic lysis of adhesions, reduction of 

incarcerated ventral hernias, repair of ventral hernias with mesh


Surgeon: JOAN Sexton DO


Assistant: Macie Delgado MD


Anesthesia: GETA, local


EBL: minimal


Urine output: 400cc


Implant: [id number] parietex composite mesh


Complications: none


Disposition: stable to pacu





HPI and indication: 44 yo F with hx of abdominal wall hernia for 1-2 years 

presents with c/o severe abdominal pain, increasing over the last 1 week, 

localized over the area of her known hernia. The patient states she had nausea 

and 1 episode of emesis. On exam, there were 2 left sided abdominal wall 

hernias which were tender but soft and reducible. CT scan showed 2 ventral 

hernias containing small bowel, without evidence of obstruction. The patient 

was hydrated over night. She was consented for robotic assisted laparoscopic, 

possible open Ventral hernia repair, possible mesh, possible bowel resection. 

All risks, benefits, and alternatives were discussed with the patient and she 

agreed to proceed.





Procedure in detail: The patient was identified in the preoperative area and 

taken back to the operating room and placed on the operating room table in 

supine position.  After anesthesia was induced, a Nagel catheter was sterilely 

placed by the circulating nurse.  The abdomen was then prepped and draped in 

usual sterile fashion and a timeout was performed.  Local anesthetic was 

infiltrated into all skin incisions.  The abdomen was insufflated via a varies 

needle in the left upper quadrant.  The position of the varies needle was 

confirmed using the saline drop test.  The abdomen was insufflated to 15 mmHg 

and a varies needle removed.  A 5 mm incision was made in the left upper 

quadrant and a 5 mm Optiview trocar was inserted through this incision.  

Abdomen was inspected there was no underlying injury to any of the abdominal 

contents.  There was evidence of adhesions from the omentum and bowel to the 

anterior abdominal wall.  The patient was then placed in jackknife position and 

tilted towards the left.  2, 8 mm robotic trocars were placed under direct 

visualization, the first in the right upper quadrant and the second in the 

right lower quadrant.  A 12 mm balloon trocar was placed in the right mid 

abdomen laterally.  The robot was then docked in the usual fashion.





First, adhesiolysis was performed and omentum and small bowel adhesions to the 

anterior abdominal wall were taken down using a combination of blunt and sharp 

dissection, as well as monopolar scissors.  Multiple hernia defects were 

encountered in the left and mid abdomen.  Small bowel and omentum incarcerated 

in the hernias was reduced with great care.  Furthermore, adhesions from the 

small bowel and omentum to the lateral abdominal wall were taken down bluntly 

and using monopolar scissors in order to create room for mesh placement.  The 

falciform ligament was also taken down using electrocautery in order to create 

space for the mesh.  Total time for lysis of adhesions was approximately 45 

minutes.  There were 6 fascial defects seen of varying sizes.  The total 

surface area of the hernia border measured from the outside of the abdominal 

wall was approximately 12cm x 26cm. a 20 cm x 30 cm parietex composite mesh was 

selected to repair the hernia.  The 5 mm left upper quadrant trocar was 

replaced with a 12 mm trocar under direct visualization.  A tagging suture was 

placed on the bowel side of the mesh.  The mesh was rolled and placed into the 

12mm left upper quadrant assistant trocar.  This was unrolled and positioned in 

order to cover all defects.  The mesh was sutured into place circumferentially 

using 3-0 V loc suture.  This portion of the procedure was prolonged secondary 

to the need to use a very large mesh to cover all the defects.  Once the mesh 

was sutured in circumferentially, the abdomen and was inspected and no injury 

seen to the small bowel or omentum.  Hemostasis was carefully achieved during 

the entire dissection and ensured at the end of the case with inspection. The 

robot was then undocked and the 12 mm right mid abdominal trocar fascia was 

closed with interrupted 0 Vicryl suture using the Eladio Leon device.  The 

left upper quadrant 12 mm trocar fascial defect was incorporated into the mesh 

repair.  The remaining trocars removed under direct visualization and the 

abdomen slowly desufflated.  Mesh was seen to lay flat.





The skin incisions were closed with 4-0 Monocryl subcuticular stitches and skin 

glue.  At the end of the case, all sponge, instrument, sharp counts were 

correct 2.  The patient was awoken from anesthesia, extubated and taken to 

PACU in stable condition.  An abdominal binder was applied.  The patient's 

daughter was notified over the telephone of her stable condition.

## 2018-04-09 NOTE — ANESTHESIA CONSULTATION
Anesthesia Consult and Med Hx





- Airway


Anesthetic Teeth Evaluation: Poor


ROM Head & Neck: Adequate


Mental/Hyoid Distance: Adequate


Mallampati Class: Class III


Intubation Access Assessment: Probably Good





- Pulmonary Exam


CTA: No (given albuterol neb for wheeze)





- Cardiac Exam


Cardiac Exam: RRR





- Pre-Operative Health Status


ASA Pre-Surgery Classification: ASA3


Proposed Anesthetic Plan: General





- Pulmonary


Hx Smoking: No


Hx Asthma: Yes


COPD: No


Hx Pneumonia: No


Hx Sleep Apnea: Yes (non compliant with CPAP)





- Cardiovascular System


Hx Hypertension: No





- Central Nervous System


Hx Seizures: No


CVA: No


Hx Psychiatric Problems: Yes (current resident at [place] on a 1013)





- Gastrointestinal


Hx Gastroesophageal Reflux Disease: Yes





- Endocrine


Hx End Stage Renal Disease: No


Hx Insulin Dependent Diabetes: No





- Hematic


Hx Anemia: No


Hx Sickle Cell Disease: No





- Other Systems


Hx Cancer: No


Hx Obesity: Yes (morbid obesity, BMI 43)

## 2018-04-09 NOTE — PROGRESS NOTE
Assessment and Plan





/ SIRS (systemic inflammatory response syndrome)


Likely from incarcerated ventral hernia


Continue abx IV fluids


Follow blood culture


Plan for surgery today, 








/Umbilical hernia, incarcerated


Surgery consulted, 


Patient is NPO after midnight, 


surgical intervention today. 








/Acute Respiratory failure


Likely from asthma exacerbation


Suplemental oxygen, nebulizer therapy, aspiration precautions








/Acute Asthma exacerbation


Continue supplemental oxygen, nebulizer therapy, inhaled steroids, IV 

antibiotics, 


Monitor with pulse oximetry








/ DVT prophylaxis 


SCD to BLE while in bed, 





Brief history: 


44 yo F with hx of asthma, COPD, obesity presents to ER from psychiatric 

facility for abdominal pain. She states she has had a left sided abdominal 

hernia for 1 year. She has been having low grade fevers and was also recently 

diagnosed with a UTI for which she has been on bactrim for 2 days. Patient has 

been in the psychiatric facility for the last week or so for suicidal 

ideations. Currently she does not admit to wanting to hurt herself.  In the ER 

workup showed incarcerated ventral hernia with elevated white count tachycardia 

and tachypnea.





Radiological test: 


Chest x-ray: No infiltrates


Abdomen and pelvis CT contrast: Moderate sized anterior pelvic wall hernia with 

at least 2 orifice containing adipose tissue and loops of small bowel without 

obstruction. s/p Cholecystectomy, nonobstructing renal calculi on right kidney.





Hospitalist Physical exam:


GENERAL: Obese female lying on bed appeared to be in no discomfort. 


HEENT: Normocephalic.  Atraumatic.  No conjunctival congestion or icterus. 

Patient has moist mucous membranes.


NECK: Supple.  Trachea midline.


CHEST/LUNGS: Clear to auscultated bilaterally, breathing nonlabored. No wheezes 

crackles or rhonchi.


HEART/CARDIOVASCULAR: Regular in rate and rhythm.  S1 and S2 positive.


ABDOMEN: Abdomen is soft, generalized tenderness on palpation. Left sided 

abdominal wall hernia soft, tender to palpation, no skin changes. Reducible but 

reoccurs. 


SKIN: There is no rash.  Warm and dry.


NEURO:  No focal motor deficit.  Follows command.


MUSCULOSKELETAL: No joint effusion or tenderness.


EXTRIMITY: No edema, no cyanosis or clubbing.


PSYCH:  Cooperative.








Subjective


Date of service: 04/09/18


Interval history: 





Patient seen and examined.  Medical records and medication list reviewed.  


No acute event overnight noted by the RN.  


Patient seen at the preop area








Objective





- Constitutional


Vitals: 


 Vital Signs - 12hr











  04/09/18 04/09/18 04/09/18





  07:27 07:44 07:45


 


Temperature 98.5 F  


 


Pulse Rate 92 H  


 


Pulse Rate [  90 





Anterior]   


 


Respiratory 18  





Rate   


 


Respiratory  18 





Rate [Anterior]   


 


Blood Pressure 94/58  


 


O2 Sat by Pulse 87  96





Oximetry   














  04/09/18 04/09/18





  07:49 10:55


 


Temperature  99.2 F


 


Pulse Rate  95 H


 


Pulse Rate [ 94 H 





Anterior]  


 


Respiratory  20





Rate  


 


Respiratory 18 





Rate [Anterior]  


 


Blood Pressure  105/75


 


O2 Sat by Pulse  100





Oximetry  














- Labs


CBC & Chem 7: 


 04/08/18 13:05





 04/08/18 13:05


Labs: 


 Abnormal lab results











  04/08/18 04/08/18 Range/Units





  13:05 13:16 


 


Chloride  95.2 L   ()  mmol/L


 


Glucose  133 H   ()  mg/dL


 


Calcium  8.3 L   (8.4-10.2)  mg/dL


 


Albumin  3.5 L   (3.9-5)  g/dL


 


Lipase  11 L   (13-60)  units/L


 


Urine pH   8.0 H  (5.0-7.0)

## 2018-04-10 LAB
BASOPHILS # (AUTO): 0 K/MM3 (ref 0–0.1)
BASOPHILS NFR BLD AUTO: 0.2 % (ref 0–1.8)
BUN SERPL-MCNC: 11 MG/DL (ref 7–17)
BUN/CREAT SERPL: 16 %
CALCIUM SERPL-MCNC: 7.2 MG/DL (ref 8.4–10.2)
EOSINOPHIL # BLD AUTO: 0 K/MM3 (ref 0–0.4)
EOSINOPHIL NFR BLD AUTO: 0.1 % (ref 0–4.3)
HCT VFR BLD CALC: 28.8 % (ref 30.3–42.9)
HEMOLYSIS INDEX: 0
HGB BLD-MCNC: 8.7 GM/DL (ref 10.1–14.3)
LYMPHOCYTES # BLD AUTO: 0.5 K/MM3 (ref 1.2–5.4)
LYMPHOCYTES NFR BLD AUTO: 5.3 % (ref 13.4–35)
MCH RBC QN AUTO: 21 PG (ref 28–32)
MCHC RBC AUTO-ENTMCNC: 30 % (ref 30–34)
MCV RBC AUTO: 71 FL (ref 79–97)
MONOCYTES # (AUTO): 0.7 K/MM3 (ref 0–0.8)
MONOCYTES % (AUTO): 7.5 % (ref 0–7.3)
PLATELET # BLD: 116 K/MM3 (ref 140–440)
RBC # BLD AUTO: 4.07 M/MM3 (ref 3.65–5.03)

## 2018-04-10 RX ADMIN — ARIPIPRAZOLE SCH MG: 10 TABLET ORAL at 09:00

## 2018-04-10 RX ADMIN — MORPHINE SULFATE PRN MG: 2 INJECTION, SOLUTION INTRAMUSCULAR; INTRAVENOUS at 11:37

## 2018-04-10 RX ADMIN — ARFORMOTEROL TARTRATE SCH MCG: 15 SOLUTION RESPIRATORY (INHALATION) at 08:23

## 2018-04-10 RX ADMIN — Medication SCH ML: at 09:04

## 2018-04-10 RX ADMIN — METRONIDAZOLE SCH MLS/HR: 5 INJECTION, SOLUTION INTRAVENOUS at 05:06

## 2018-04-10 RX ADMIN — ARIPIPRAZOLE SCH MG: 10 TABLET ORAL at 21:39

## 2018-04-10 RX ADMIN — BUPROPION HYDROCHLORIDE SCH MG: 150 TABLET, EXTENDED RELEASE ORAL at 09:04

## 2018-04-10 RX ADMIN — CEFAZOLIN SCH MLS/HR: 330 INJECTION, POWDER, FOR SOLUTION INTRAMUSCULAR; INTRAVENOUS at 06:17

## 2018-04-10 RX ADMIN — TRAMADOL HYDROCHLORIDE PRN MG: 50 TABLET, COATED ORAL at 23:25

## 2018-04-10 RX ADMIN — MORPHINE SULFATE PRN MG: 2 INJECTION, SOLUTION INTRAMUSCULAR; INTRAVENOUS at 04:59

## 2018-04-10 RX ADMIN — SODIUM CHLORIDE, SODIUM LACTATE, POTASSIUM CHLORIDE, AND CALCIUM CHLORIDE SCH MLS/HR: .6; .31; .03; .02 INJECTION, SOLUTION INTRAVENOUS at 10:20

## 2018-04-10 RX ADMIN — ALBUTEROL SULFATE PRN MG: 2.5 SOLUTION RESPIRATORY (INHALATION) at 03:40

## 2018-04-10 RX ADMIN — ARFORMOTEROL TARTRATE SCH MCG: 15 SOLUTION RESPIRATORY (INHALATION) at 19:47

## 2018-04-10 RX ADMIN — PANTOPRAZOLE SODIUM SCH MG: 40 INJECTION, POWDER, FOR SOLUTION INTRAVENOUS at 09:00

## 2018-04-10 RX ADMIN — ACETAMINOPHEN PRN MG: 325 TABLET ORAL at 17:55

## 2018-04-10 RX ADMIN — ALPRAZOLAM SCH MG: 1 TABLET ORAL at 21:38

## 2018-04-10 RX ADMIN — METRONIDAZOLE SCH MLS/HR: 5 INJECTION, SOLUTION INTRAVENOUS at 05:09

## 2018-04-10 RX ADMIN — BUDESONIDE SCH MG: 0.5 INHALANT RESPIRATORY (INHALATION) at 19:47

## 2018-04-10 RX ADMIN — ACETAMINOPHEN PRN MG: 325 TABLET ORAL at 04:58

## 2018-04-10 RX ADMIN — ACETAMINOPHEN PRN MG: 325 TABLET ORAL at 23:24

## 2018-04-10 RX ADMIN — OXYCODONE AND ACETAMINOPHEN PRN TAB: 5; 325 TABLET ORAL at 03:36

## 2018-04-10 RX ADMIN — ENOXAPARIN SODIUM SCH MG: 100 INJECTION SUBCUTANEOUS at 09:00

## 2018-04-10 RX ADMIN — HYDROCODONE BITARTRATE AND ACETAMINOPHEN SCH EACH: 5; 325 TABLET ORAL at 13:32

## 2018-04-10 RX ADMIN — MONTELUKAST SCH MG: 10 TABLET, FILM COATED ORAL at 17:23

## 2018-04-10 RX ADMIN — Medication SCH ML: at 23:26

## 2018-04-10 RX ADMIN — ALPRAZOLAM SCH MG: 1 TABLET ORAL at 09:00

## 2018-04-10 RX ADMIN — BUDESONIDE SCH MG: 0.5 INHALANT RESPIRATORY (INHALATION) at 08:23

## 2018-04-10 RX ADMIN — HYDROCODONE BITARTRATE AND ACETAMINOPHEN SCH EACH: 5; 325 TABLET ORAL at 08:59

## 2018-04-10 NOTE — PROGRESS NOTE
Assessment and Plan





42 yo F s/p Robotic assisted laparoscopic lysis of adhesions, reduction of 

incarcerated ventral hernias, repair of ventral hernias with mesh. POD 1





Plan:


1. adv to reg diet


2. dc IVF


3. dc IV pain meds, c/w PRN PO pain control. Will change to tramadol PO


4. OOB/ambulate - this was stressed to the patient


5. incentive spirometry - I personally instructed the patient on how to use 

spirometer and encouraged use 10x/hour


6. SCIPs abx


7. DVT ppx - SCDS, lovenox


8. If patient is ambulating, tolerating a diet, and pain controlled with PO 

pain meds in am, she will be cleared for dc from surgery standpoint. 





D/W Dr. Fofana





Subjective


Date of service: 04/10/18


Narrative: 





Pt seen and examined. c/o mild abd pain over left side of abdomen. She has not 

been OOB. Her us was removed this am. Tm 101 overnight. She tolerated liquid 

diet and is passing flatus. 





Objective


 Vital Signs - 12hr











  04/10/18 04/10/18 04/10/18





  03:15 03:38 04:30


 


Temperature   101.1 F H


 


Pulse Rate   105 H


 


Pulse Rate [ 100 H 105 H 





Anterior]   


 


Respiratory   24





Rate   


 


Respiratory 20 20 





Rate [Anterior]   


 


Blood Pressure   


 


Blood Pressure   100/48





[Left]   


 


O2 Sat by Pulse   96





Oximetry   














  04/10/18 04/10/18 04/10/18





  05:48 06:56 07:29


 


Temperature 99.7 F H  


 


Pulse Rate 104 H 99 H 101 H


 


Pulse Rate [   





Anterior]   


 


Respiratory 22  





Rate   


 


Respiratory   





Rate [Anterior]   


 


Blood Pressure 99/46  


 


Blood Pressure   





[Left]   


 


O2 Sat by Pulse 93 95 93





Oximetry   














  04/10/18 04/10/18 04/10/18





  07:31 08:15 08:35


 


Temperature 99.9 F H  


 


Pulse Rate 100 H  


 


Pulse Rate [  98 H 104 H





Anterior]   


 


Respiratory 20  





Rate   


 


Respiratory  18 20





Rate [Anterior]   


 


Blood Pressure 108/52  


 


Blood Pressure   





[Left]   


 


O2 Sat by Pulse 93  





Oximetry   














  04/10/18 04/10/18





  10:00 11:50


 


Temperature  99.6 F


 


Pulse Rate  96 H


 


Pulse Rate [  





Anterior]  


 


Respiratory  19





Rate  


 


Respiratory  





Rate [Anterior]  


 


Blood Pressure  104/56


 


Blood Pressure  





[Left]  


 


O2 Sat by Pulse 99 93





Oximetry  














- General physical appearance


Narrative Exam: 





Gen: AAOx3. NAD. Sleepy


CV: S1, S2+


Resp: No audible wheezes. 


Abd: soft, NT, ND. incisions c/d/i. Abd binder in place


Ext: no c/c/e











- Labs





 04/10/18 04:25





 04/10/18 04:25


 Diabetes panel











  04/10/18 Range/Units





  04:25 


 


Sodium  135 L  (137-145)  mmol/L


 


Potassium  4.4  (3.6-5.0)  mmol/L


 


Chloride  97.6 L  ()  mmol/L


 


Carbon Dioxide  27  (22-30)  mmol/L


 


BUN  11  (7-17)  mg/dL


 


Creatinine  0.7  (0.7-1.2)  mg/dL


 


Glucose  153 H  ()  mg/dL


 


Calcium  7.2 L  (8.4-10.2)  mg/dL








 Calcium panel











  04/10/18 Range/Units





  04:25 


 


Calcium  7.2 L  (8.4-10.2)  mg/dL








 Pituitary panel











  04/10/18 Range/Units





  04:25 


 


Sodium  135 L  (137-145)  mmol/L


 


Potassium  4.4  (3.6-5.0)  mmol/L


 


Chloride  97.6 L  ()  mmol/L


 


Carbon Dioxide  27  (22-30)  mmol/L


 


BUN  11  (7-17)  mg/dL


 


Creatinine  0.7  (0.7-1.2)  mg/dL


 


Glucose  153 H  ()  mg/dL


 


Calcium  7.2 L  (8.4-10.2)  mg/dL








 Adrenal panel











  04/10/18 Range/Units





  04:25 


 


Sodium  135 L  (137-145)  mmol/L


 


Potassium  4.4  (3.6-5.0)  mmol/L


 


Chloride  97.6 L  ()  mmol/L


 


Carbon Dioxide  27  (22-30)  mmol/L


 


BUN  11  (7-17)  mg/dL


 


Creatinine  0.7  (0.7-1.2)  mg/dL


 


Glucose  153 H  ()  mg/dL


 


Calcium  7.2 L  (8.4-10.2)  mg/dL

## 2018-04-10 NOTE — PROGRESS NOTE
Assessment and Plan


Assessment and plan: 


44 yo F with hx of asthma, COPD, obesity presents to ER from psychiatric 

facility for abdominal pain. She states she has had a left sided abdominal 

hernia for 1 year. She has been having low grade fevers and was also recently 

diagnosed with a UTI for which she has been on bactrim for 2 days. Patient has 

been in the psychiatric facility for the last week for suicidal ideations. 

Currently she does not admit to wanting to hurt herself.  In the ER workup 

showed incarcerated ventral hernia with elevated white count tachycardia and 

tachypnea





Umbilical hernia, incarcerated


- Surgery consulted, first postop day secondary to lysis of adhesions and 

repair of hernia 


- Patient advised to ambulate


- Started on diet, DC the IV pain medicine 


SIRS (systemic inflammatory response syndrome)


- Likely due to the incarcerated hernia 


- DC the IV antibiotics





Acute Respiratory failure


- Likely from asthma exacerbation


- Suplemental oxygen, nebulizer therapy, aspiration precautions





Acute Asthma exacerbation


- Continue supplemental oxygen, nebulizer therapy, inhaled steroids


 DVT prophylaxis 


- Lovenox


Disposition


- Possible discharge tomorrow.











History


Interval history: 





Patient was seen and evaluated this morning, first postoperative day after 

lysis of adhesion and hernia repair.











Hospitalist Physical





- Physical exam


Narrative exam: 





 Not in cardiopulmonary distress. 


 The patient is morbidly obese.


 Vital signs as documented.


 Head exam is unremarkable.


 No scleral icterus .


 Neck is without jugular venous distension, thyromegaly, or carotid bruits. 


 Lungs are clear to auscultation.


Cardiac exam reveals regular rate and  Rhythm.


Abdominal exam reveals normal bowel sounds, clean dressing. 


Extremities are nonedematous and both femoral and pedal pulses are normal.


CNS: Alert and oriented 3.  No focal weakness.





- Constitutional


Vitals: 


 











Temp Pulse Resp BP Pulse Ox


 


 99.6 F   96 H  19   104/56   93 


 


 04/10/18 11:50  04/10/18 11:50  04/10/18 11:50  04/10/18 11:50  04/10/18 11:50











General appearance: Present: mild distress, obese





Results





- Labs


CBC & Chem 7: 


 04/10/18 04:25





 04/10/18 04:25


Labs: 


 Laboratory Last Values











WBC  9.1 K/mm3 (4.5-11.0)   04/10/18  04:25    


 


RBC  4.07 M/mm3 (3.65-5.03)   04/10/18  04:25    


 


Hgb  8.7 gm/dl (10.1-14.3)  L  04/10/18  04:25    


 


Hct  28.8 % (30.3-42.9)  L  04/10/18  04:25    


 


MCV  71 fl (79-97)  L  04/10/18  04:25    


 


MCH  21 pg (28-32)  L  04/10/18  04:25    


 


MCHC  30 % (30-34)   04/10/18  04:25    


 


RDW  16.4 % (13.2-15.2)  H  04/10/18  04:25    


 


Plt Count  116 K/mm3 (140-440)  L  04/10/18  04:25    


 


Lymph % (Auto)  5.3 % (13.4-35.0)  L  04/10/18  04:25    


 


Mono % (Auto)  7.5 % (0.0-7.3)  H  04/10/18  04:25    


 


Eos % (Auto)  0.1 % (0.0-4.3)   04/10/18  04:25    


 


Baso % (Auto)  0.2 % (0.0-1.8)   04/10/18  04:25    


 


Lymph #  0.5 K/mm3 (1.2-5.4)  L  04/10/18  04:25    


 


Mono #  0.7 K/mm3 (0.0-0.8)   04/10/18  04:25    


 


Eos #  0.0 K/mm3 (0.0-0.4)   04/10/18  04:25    


 


Baso #  0.0 K/mm3 (0.0-0.1)   04/10/18  04:25    


 


Seg Neutrophils %  86.9 % (40.0-70.0)  H  04/10/18  04:25    


 


Seg Neutrophils #  7.9 K/mm3 (1.8-7.7)  H  04/10/18  04:25    


 


Sodium  135 mmol/L (137-145)  L  04/10/18  04:25    


 


Potassium  4.4 mmol/L (3.6-5.0)   04/10/18  04:25    


 


Chloride  97.6 mmol/L ()  L  04/10/18  04:25    


 


Carbon Dioxide  27 mmol/L (22-30)   04/10/18  04:25    


 


Anion Gap  15 mmol/L  04/10/18  04:25    


 


BUN  11 mg/dL (7-17)   04/10/18  04:25    


 


Creatinine  0.7 mg/dL (0.7-1.2)   04/10/18  04:25    


 


Estimated GFR  > 60 ml/min  04/10/18  04:25    


 


BUN/Creatinine Ratio  16 %  04/10/18  04:25    


 


Glucose  153 mg/dL ()  H  04/10/18  04:25    


 


Calcium  7.2 mg/dL (8.4-10.2)  L  04/10/18  04:25    


 


Total Bilirubin  0.60 mg/dL (0.1-1.2)   04/08/18  13:05    


 


AST  23 units/L (5-40)   04/08/18  13:05    


 


ALT  18 units/L (7-56)   04/08/18  13:05    


 


Alkaline Phosphatase  48 units/L ()   04/08/18  13:05    


 


Total Protein  6.5 g/dL (6.3-8.2)   04/08/18  13:05    


 


Albumin  3.5 g/dL (3.9-5)  L  04/08/18  13:05    


 


Albumin/Globulin Ratio  1.2 %  04/08/18  13:05    


 


Lipase  11 units/L (13-60)  L  04/08/18  13:05    


 


Urine Color  Yellow  (Yellow)   04/08/18  13:16    


 


Urine Turbidity  Clear  (Clear)   04/08/18  13:16    


 


Urine pH  8.0  (5.0-7.0)  H  04/08/18  13:16    


 


Ur Specific Gravity  1.015  (1.003-1.030)   04/08/18  13:16    


 


Urine Protein  <15 mg/dl mg/dL (Negative)   04/08/18  13:16    


 


Urine Glucose (UA)  Neg mg/dL (Negative)   04/08/18  13:16    


 


Urine Ketones  Neg mg/dL (Negative)   04/08/18  13:16    


 


Urine Blood  Neg  (Negative)   04/08/18  13:16    


 


Urine Nitrite  Neg  (Negative)   04/08/18  13:16    


 


Urine Bilirubin  Neg  (Negative)   04/08/18  13:16    


 


Urine Urobilinogen  < 2.0 mg/dL (<2.0)   04/08/18  13:16    


 


Ur Leukocyte Esterase  Tr  (Negative)   04/08/18  13:16    


 


Urine WBC (Auto)  2.0 /HPF (0.0-6.0)   04/08/18  13:16    


 


Urine RBC (Auto)  2.0 /HPF (0.0-6.0)   04/08/18  13:16    


 


U Epithel Cells (Auto)  2.0 /HPF (0-13.0)   04/08/18  13:16    


 


Urine Bacteria (Auto)  1+ /HPF (Negative)   04/08/18  13:16

## 2018-04-11 RX ADMIN — DOCUSATE SODIUM SCH MG: 100 CAPSULE, LIQUID FILLED ORAL at 16:06

## 2018-04-11 RX ADMIN — TRAMADOL HYDROCHLORIDE PRN MG: 50 TABLET, COATED ORAL at 07:20

## 2018-04-11 RX ADMIN — BUDESONIDE SCH MG: 0.5 INHALANT RESPIRATORY (INHALATION) at 19:22

## 2018-04-11 RX ADMIN — ALPRAZOLAM SCH MG: 1 TABLET ORAL at 09:26

## 2018-04-11 RX ADMIN — Medication SCH ML: at 21:53

## 2018-04-11 RX ADMIN — BUPROPION HYDROCHLORIDE SCH MG: 150 TABLET, EXTENDED RELEASE ORAL at 09:25

## 2018-04-11 RX ADMIN — ARIPIPRAZOLE SCH MG: 10 TABLET ORAL at 09:26

## 2018-04-11 RX ADMIN — ALPRAZOLAM SCH MG: 1 TABLET ORAL at 21:53

## 2018-04-11 RX ADMIN — ARFORMOTEROL TARTRATE SCH MCG: 15 SOLUTION RESPIRATORY (INHALATION) at 19:22

## 2018-04-11 RX ADMIN — DOCUSATE SODIUM SCH MG: 100 CAPSULE, LIQUID FILLED ORAL at 21:53

## 2018-04-11 RX ADMIN — BUDESONIDE SCH MG: 0.5 INHALANT RESPIRATORY (INHALATION) at 08:54

## 2018-04-11 RX ADMIN — ARFORMOTEROL TARTRATE SCH MCG: 15 SOLUTION RESPIRATORY (INHALATION) at 08:54

## 2018-04-11 RX ADMIN — PANTOPRAZOLE SODIUM SCH MG: 40 TABLET, DELAYED RELEASE ORAL at 09:26

## 2018-04-11 RX ADMIN — HYDROCODONE BITARTRATE AND ACETAMINOPHEN PRN EACH: 10; 325 TABLET ORAL at 15:00

## 2018-04-11 RX ADMIN — MONTELUKAST SCH MG: 10 TABLET, FILM COATED ORAL at 19:18

## 2018-04-11 RX ADMIN — ENOXAPARIN SODIUM SCH MG: 100 INJECTION SUBCUTANEOUS at 09:25

## 2018-04-11 RX ADMIN — ARIPIPRAZOLE SCH MG: 10 TABLET ORAL at 21:52

## 2018-04-11 RX ADMIN — KETOROLAC TROMETHAMINE SCH MG: 30 INJECTION, SOLUTION INTRAMUSCULAR at 19:19

## 2018-04-11 RX ADMIN — Medication SCH ML: at 09:43

## 2018-04-11 NOTE — PROGRESS NOTE
Assessment and Plan


Assessment and plan: 


42 yo F with hx of asthma, COPD, obesity presents to ER from psychiatric 

facility for abdominal pain. She states she has had a left sided abdominal 

hernia for 1 year. She has been having low grade fevers and was also recently 

diagnosed with a UTI for which she has been on bactrim for 2 days. Patient has 

been in the psychiatric facility for the last week for suicidal ideations. 

Currently she does not admit to wanting to hurt herself.  In the ER workup 

showed incarcerated ventral hernia with elevated white count tachycardia and 

tachypnea





Umbilical hernia, incarcerated


- Surgery consulted, first postop day secondary to lysis of adhesions and 

repair of hernia 


- Patient advised to ambulate


- Started on diet, DC the IV pain medicine 


SIRS (systemic inflammatory response syndrome)


- Likely due to the incarcerated hernia and post op condition





Acute Respiratory failure


- Likely from asthma exacerbation


- Suplemental oxygen, nebulizer therapy, aspiration precautions





Acute Asthma exacerbation


- Continue supplemental oxygen, nebulizer therapy, inhaled steroids


Psych illness


- Patient is on 1013 from the emergency department, patient denied suicidal 

ideation


- Psych consulted for evaluation


Deconditioning


- PT consult


 DVT prophylaxis 


- Lovenox


Disposition


- Possible discharge tomorrow.











History


Interval history: 





Patient was seen and evaluated this morning, second postoperative day after 

lysis of adhesion and hernia repair.  Patient was complaining pain and is not 

ambulating.  Patient on 1013 in the ED but she denied suicidal ideation.











Hospitalist Physical





- Physical exam


Narrative exam: 





 Not in cardiopulmonary distress. 


 The patient is morbidly obese.


 Vital signs as documented.


 Head exam is unremarkable.


 No scleral icterus .


 Neck is without jugular venous distension, thyromegaly, or carotid bruits. 


 Lungs are clear to auscultation.


Cardiac exam reveals regular rate and  Rhythm.


Abdominal exam reveals normal bowel sounds, clean dressing. 


Extremities are nonedematous and both femoral and pedal pulses are normal.


CNS: Alert and oriented 3.  No focal weakness.





- Constitutional


Vitals: 


 











Temp Pulse Resp BP Pulse Ox


 


 99.8 F H  94 H  20   102/57   93 


 


 04/11/18 03:39  04/11/18 09:01  04/11/18 09:01  04/11/18 03:39  04/11/18 08:52











General appearance: Present: mild distress, obese





Results





- Labs


CBC & Chem 7: 


 04/10/18 04:25





 04/10/18 04:25


Labs: 


 Laboratory Last Values











WBC  9.1 K/mm3 (4.5-11.0)   04/10/18  04:25    


 


RBC  4.07 M/mm3 (3.65-5.03)   04/10/18  04:25    


 


Hgb  8.7 gm/dl (10.1-14.3)  L  04/10/18  04:25    


 


Hct  28.8 % (30.3-42.9)  L  04/10/18  04:25    


 


MCV  71 fl (79-97)  L  04/10/18  04:25    


 


MCH  21 pg (28-32)  L  04/10/18  04:25    


 


MCHC  30 % (30-34)   04/10/18  04:25    


 


RDW  16.4 % (13.2-15.2)  H  04/10/18  04:25    


 


Plt Count  116 K/mm3 (140-440)  L  04/10/18  04:25    


 


Lymph % (Auto)  5.3 % (13.4-35.0)  L  04/10/18  04:25    


 


Mono % (Auto)  7.5 % (0.0-7.3)  H  04/10/18  04:25    


 


Eos % (Auto)  0.1 % (0.0-4.3)   04/10/18  04:25    


 


Baso % (Auto)  0.2 % (0.0-1.8)   04/10/18  04:25    


 


Lymph #  0.5 K/mm3 (1.2-5.4)  L  04/10/18  04:25    


 


Mono #  0.7 K/mm3 (0.0-0.8)   04/10/18  04:25    


 


Eos #  0.0 K/mm3 (0.0-0.4)   04/10/18  04:25    


 


Baso #  0.0 K/mm3 (0.0-0.1)   04/10/18  04:25    


 


Seg Neutrophils %  86.9 % (40.0-70.0)  H  04/10/18  04:25    


 


Seg Neutrophils #  7.9 K/mm3 (1.8-7.7)  H  04/10/18  04:25    


 


Sodium  135 mmol/L (137-145)  L  04/10/18  04:25    


 


Potassium  4.4 mmol/L (3.6-5.0)   04/10/18  04:25    


 


Chloride  97.6 mmol/L ()  L  04/10/18  04:25    


 


Carbon Dioxide  27 mmol/L (22-30)   04/10/18  04:25    


 


Anion Gap  15 mmol/L  04/10/18  04:25    


 


BUN  11 mg/dL (7-17)   04/10/18  04:25    


 


Creatinine  0.7 mg/dL (0.7-1.2)   04/10/18  04:25    


 


Estimated GFR  > 60 ml/min  04/10/18  04:25    


 


BUN/Creatinine Ratio  16 %  04/10/18  04:25    


 


Glucose  153 mg/dL ()  H  04/10/18  04:25    


 


Calcium  7.2 mg/dL (8.4-10.2)  L  04/10/18  04:25    


 


Total Bilirubin  0.60 mg/dL (0.1-1.2)   04/08/18  13:05    


 


AST  23 units/L (5-40)   04/08/18  13:05    


 


ALT  18 units/L (7-56)   04/08/18  13:05    


 


Alkaline Phosphatase  48 units/L ()   04/08/18  13:05    


 


Total Protein  6.5 g/dL (6.3-8.2)   04/08/18  13:05    


 


Albumin  3.5 g/dL (3.9-5)  L  04/08/18  13:05    


 


Albumin/Globulin Ratio  1.2 %  04/08/18  13:05    


 


Lipase  11 units/L (13-60)  L  04/08/18  13:05    


 


Urine Color  Yellow  (Yellow)   04/08/18  13:16    


 


Urine Turbidity  Clear  (Clear)   04/08/18  13:16    


 


Urine pH  8.0  (5.0-7.0)  H  04/08/18  13:16    


 


Ur Specific Gravity  1.015  (1.003-1.030)   04/08/18  13:16    


 


Urine Protein  <15 mg/dl mg/dL (Negative)   04/08/18  13:16    


 


Urine Glucose (UA)  Neg mg/dL (Negative)   04/08/18  13:16    


 


Urine Ketones  Neg mg/dL (Negative)   04/08/18  13:16    


 


Urine Blood  Neg  (Negative)   04/08/18  13:16    


 


Urine Nitrite  Neg  (Negative)   04/08/18  13:16    


 


Urine Bilirubin  Neg  (Negative)   04/08/18  13:16    


 


Urine Urobilinogen  < 2.0 mg/dL (<2.0)   04/08/18  13:16    


 


Ur Leukocyte Esterase  Tr  (Negative)   04/08/18  13:16    


 


Urine WBC (Auto)  2.0 /HPF (0.0-6.0)   04/08/18  13:16    


 


Urine RBC (Auto)  2.0 /HPF (0.0-6.0)   04/08/18  13:16    


 


U Epithel Cells (Auto)  2.0 /HPF (0-13.0)   04/08/18  13:16    


 


Urine Bacteria (Auto)  1+ /HPF (Negative)   04/08/18  13:16

## 2018-04-11 NOTE — PROGRESS NOTE
Assessment and Plan





4 yo F s/p Robotic assisted laparoscopic lysis of adhesions, reduction of 

incarcerated ventral hernias, repair of ventral hernias with mesh. POD 2





Plan:


1. reg diet, add protein supplements


2. change PO pain meds to Norco, no IV pain meds


3. OOB/ambulate- this was stressed to the patient again


4. incentive spirometry - encouraged use 10x/hour


5. DVT ppx - SCDS, lovenox


6. fevers likely secondary to atelectasis as patient has poor IS use. Bl cx 

obtained yesterday. Continue to monitor for fevers. 


7. stool softeners





Thank you for this consultation, please call with questions or concerns.





Subjective


Date of service: 04/11/18


Narrative: 





Pt seen and examined. c/o incisional pain. States she has been OOB and 

ambulated around her room. + fevers overnight. Patient not taking deep breaths. 

States pain is not controlled with percocet and makes her sleepy. Requesting IV 

morphine. Tolerated diet but has no appetite. + Flatus, no BM.





Objective


 Vital Signs - 12hr











  04/11/18 04/11/18 04/11/18





  03:39 08:52 08:55


 


Temperature 99.8 F H  


 


Pulse Rate 95 H  


 


Pulse Rate [   97 H





Anterior]   


 


Respiratory 18  





Rate   


 


Respiratory   20





Rate [Anterior]   


 


Blood Pressure 102/57  


 


O2 Sat by Pulse 91 93 





Oximetry   














  04/11/18





  09:01


 


Temperature 


 


Pulse Rate 


 


Pulse Rate [ 94 H





Anterior] 


 


Respiratory 





Rate 


 


Respiratory 20





Rate [Anterior] 


 


Blood Pressure 


 


O2 Sat by Pulse 





Oximetry 














- General physical appearance


Narrative Exam: 





Gen: AAOx3. NAD


CV: S1, s2+


resp: even and unlabored. 500cc on IS


Abd: soft, ND, incisions c/d/i. +TTP in epigastric area. no r/r/g


Ext: no c/c/e





- Labs





 04/10/18 04:25





 04/10/18 04:25

## 2018-04-12 VITALS — DIASTOLIC BLOOD PRESSURE: 49 MMHG | SYSTOLIC BLOOD PRESSURE: 108 MMHG

## 2018-04-12 LAB
BUN SERPL-MCNC: 11 MG/DL (ref 7–17)
BUN/CREAT SERPL: 22 %
CALCIUM SERPL-MCNC: 7.9 MG/DL (ref 8.4–10.2)
HEMOLYSIS INDEX: 0

## 2018-04-12 RX ADMIN — ENOXAPARIN SODIUM SCH: 100 INJECTION SUBCUTANEOUS at 10:42

## 2018-04-12 RX ADMIN — PANTOPRAZOLE SODIUM SCH MG: 40 TABLET, DELAYED RELEASE ORAL at 10:41

## 2018-04-12 RX ADMIN — DOCUSATE SODIUM SCH MG: 100 CAPSULE, LIQUID FILLED ORAL at 10:41

## 2018-04-12 RX ADMIN — HYDROCODONE BITARTRATE AND ACETAMINOPHEN PRN EACH: 10; 325 TABLET ORAL at 09:06

## 2018-04-12 RX ADMIN — MONTELUKAST SCH MG: 10 TABLET, FILM COATED ORAL at 17:39

## 2018-04-12 RX ADMIN — KETOROLAC TROMETHAMINE SCH MG: 30 INJECTION, SOLUTION INTRAMUSCULAR at 02:33

## 2018-04-12 RX ADMIN — BUDESONIDE SCH MG: 0.5 INHALANT RESPIRATORY (INHALATION) at 08:43

## 2018-04-12 RX ADMIN — ARIPIPRAZOLE SCH MG: 10 TABLET ORAL at 10:41

## 2018-04-12 RX ADMIN — Medication SCH ML: at 10:42

## 2018-04-12 RX ADMIN — ARFORMOTEROL TARTRATE SCH MCG: 15 SOLUTION RESPIRATORY (INHALATION) at 08:43

## 2018-04-12 RX ADMIN — BUPROPION HYDROCHLORIDE SCH MG: 150 TABLET, EXTENDED RELEASE ORAL at 10:41

## 2018-04-12 RX ADMIN — ALPRAZOLAM SCH MG: 1 TABLET ORAL at 10:41

## 2018-04-12 RX ADMIN — KETOROLAC TROMETHAMINE SCH MG: 30 INJECTION, SOLUTION INTRAMUSCULAR at 17:39

## 2018-04-12 RX ADMIN — KETOROLAC TROMETHAMINE SCH MG: 30 INJECTION, SOLUTION INTRAMUSCULAR at 10:42

## 2018-04-12 NOTE — DISCHARGE SUMMARY
<TOM PERDOMO - Last Filed: 04/12/18 10:36>





Providers





- Providers


Date of Admission: 


04/08/18 16:28





Attending physician: 


BOB SPIVEY MD





 





04/11/18 11:02


Physical Therapy Evaluation and Treat [CONS] Urgent 


   Comment: Discharge on hold pending PT eval please see today


   Reason For Exam: Debility





04/11/18 13:09


Consult to Mental Health [CONS] Routine 


   Reason For Exam: 1013


   Place consult to:: mental health


   Notified:: Molly











Primary care physician: 


JENNIFER LANDIS








Hospitalization


Condition: Stable


Disposition: DC-01 TO HOME OR SELFCARE





Exam





- Constitutional


Vitals: 


 











Temp Pulse Resp BP Pulse Ox


 


 98.7 F   91 H  20   97/56   91 


 


 04/12/18 07:37  04/12/18 09:01  04/12/18 09:01  04/12/18 07:37  04/12/18 08:46














Plan


Activity: other (Avoid lifting more than 15 lbs for the next 4-6 weeks. Stay 

active and walk for a few minutes every hour.)


Wound: open to air (May shower, pat incisions dry, do not scrub incisions. Do 

not submerge incisions in water until healed.), other (Wear abdominal binder at 

all times for the next 2 weeks. May remove to shower.)


Additional Instructions: Call surgeon's office if you have fevers>100.4, any 

redness or drainage from incisions.  Use the incentive spirometer at home 10 

times every hour while awake to help exercise lungs.


Follow up with: 


JENNIFER LANDIS MD [Primary Care Provider] - 7 Days


TOM PERDOMO DO [Staff Physician] - 14 Days


Prescriptions: 


HYDROcodone/APAP  [Raymond 10/325] 1 each PO Q8HR PRN #12 tablet


 PRN Reason: Pain





<BOB SPIVEY - Last Filed: 04/12/18 12:24>





Providers





- Providers


Date of Admission: 


04/08/18 16:28





Attending physician: 


BOB SPIVEY MD





 





04/11/18 11:02


Physical Therapy Evaluation and Treat [CONS] Urgent 


   Comment: Discharge on hold pending PT eval please see today


   Reason For Exam: Debility





04/11/18 13:09


Consult to Mental Health [CONS] Routine 


   Reason For Exam: 1013


   Place consult to:: mental health


   Notified:: Molly











Primary care physician: 


JENNIFER LANDIS








Hospitalization


Reason for admission: Incarcerated hernia


Hospital course: 





Home O2 evaluation was done, and patient needed oxygen at home but the patient 

declined home oxygen. Patient said she is not going to pay for home oxygen


Time spent for discharge: 32 minutes





- Discharge Diagnoses


(1) Incarcerated ventral hernia


Status: Acute   





(2) SIRS (systemic inflammatory response syndrome)


Status: Acute   





(3) Umbilical hernia, incarcerated


Status: Acute   





(4) Abdominal pain


Status: Acute   





(5) GERD (gastroesophageal reflux disease)


Status: Chronic   





(6) Metabolic syndrome


Status: Chronic   





(7) Noncompliance


Status: Chronic   





(8) Obesity


Status: Chronic   


Qualifiers: 


   Body mass index: BMI 45.0-49.9 





Core Measure Documentation





- Palliative Care


Palliative Care/ Comfort Measures: Not Applicable





- Core Measures


Any of the following diagnoses?: none





Exam





- Physical Exam


Narrative exam: 





 Not in cardiopulmonary distress. 


 The patient is morbidly obese.


 Vital signs as documented.


 Head exam is unremarkable.


 No scleral icterus .


 Neck is without jugular venous distension, thyromegaly, or carotid bruits. 


 Lungs are clear to auscultation.


Cardiac exam reveals regular rate and  Rhythm.


Abdominal exam reveals normal bowel sounds, clean dressing. 


Extremities are nonedematous and both femoral and pedal pulses are normal.


CNS: Alert and oriented 3.  No focal weakness.





- Constitutional


Vitals: 


 











Temp Pulse Resp BP Pulse Ox


 


 98.7 F   91 H  20   97/56   91 


 


 04/12/18 07:37  04/12/18 09:01  04/12/18 09:01  04/12/18 07:37  04/12/18 08:46














Plan


Activity: advance as tolerated


Weight Bearing Status: Full Weight Bearing


Diet: low fat, low cholesterol

## 2018-04-12 NOTE — PROGRESS NOTE
Assessment and Plan





42 yo F s/p Robotic assisted laparoscopic lysis of adhesions, reduction of 

incarcerated ventral hernias, repair of ventral hernias with mesh. POD 3





Plan:


1. reg diet, add protein supplements


2. PO pain meds


3. OOB/ambulate


4. incentive spirometry -will  be sent home with patient


5. DVT ppx - SCDS, lovenox


6. afebrile x 24 hours


7. stool softeners PRN


8. ok to dc from surgery standpoint, patient given verbal and written post 

surgical instructions. Follow up in surgery office in 2 weeks





D/W Dr. Fofana





Thank you for this consultation, please call with questions or concerns.





Subjective


Date of service: 04/12/18


Narrative: 





Pt seen and examined. In great spirits today. States she feels much better. 

Tolerating a diet. Pain is decreased and well controlled with PO pain meds. No n

/v. NO f/c for 24 hours. She is ambulating on her own.





Objective


 Vital Signs - 12hr











  04/12/18 04/12/18 04/12/18





  04:19 07:37 08:43


 


Temperature 98.8 F 98.7 F 


 


Pulse Rate 93 H 91 H 


 


Pulse Rate [   94 H





Anterior]   


 


Respiratory 20 24 





Rate   


 


Respiratory   18





Rate [Anterior]   


 


Blood Pressure 95/51 97/56 


 


O2 Sat by Pulse 87 91 





Oximetry   














  04/12/18 04/12/18





  08:46 09:01


 


Temperature  


 


Pulse Rate  


 


Pulse Rate [  91 H





Anterior]  


 


Respiratory  





Rate  


 


Respiratory  20





Rate [Anterior]  


 


Blood Pressure  


 


O2 Sat by Pulse 91 





Oximetry  














- General physical appearance


Narrative Exam: 





Gen: AAOx3. NAD


CV: S1, s2+


Resp: even and unlabored


abd: soft, NT, ND. incisions c/d/i. Abd binder in place.


Ext: no c/c/e





- Labs





 04/10/18 04:25





 04/12/18 05:53


 Diabetes panel











  04/12/18 Range/Units





  05:53 


 


Sodium  139  (137-145)  mmol/L


 


Potassium  3.8  (3.6-5.0)  mmol/L


 


Chloride  99.1  ()  mmol/L


 


Carbon Dioxide  29  (22-30)  mmol/L


 


BUN  11  (7-17)  mg/dL


 


Creatinine  0.5 L  (0.7-1.2)  mg/dL


 


Glucose  105 H  ()  mg/dL


 


Calcium  7.9 L  (8.4-10.2)  mg/dL








 Calcium panel











  04/12/18 Range/Units





  05:53 


 


Calcium  7.9 L  (8.4-10.2)  mg/dL








 Pituitary panel











  04/12/18 Range/Units





  05:53 


 


Sodium  139  (137-145)  mmol/L


 


Potassium  3.8  (3.6-5.0)  mmol/L


 


Chloride  99.1  ()  mmol/L


 


Carbon Dioxide  29  (22-30)  mmol/L


 


BUN  11  (7-17)  mg/dL


 


Creatinine  0.5 L  (0.7-1.2)  mg/dL


 


Glucose  105 H  ()  mg/dL


 


Calcium  7.9 L  (8.4-10.2)  mg/dL








 Adrenal panel











  04/12/18 Range/Units





  05:53 


 


Sodium  139  (137-145)  mmol/L


 


Potassium  3.8  (3.6-5.0)  mmol/L


 


Chloride  99.1  ()  mmol/L


 


Carbon Dioxide  29  (22-30)  mmol/L


 


BUN  11  (7-17)  mg/dL


 


Creatinine  0.5 L  (0.7-1.2)  mg/dL


 


Glucose  105 H  ()  mg/dL


 


Calcium  7.9 L  (8.4-10.2)  mg/dL

## 2018-05-08 ENCOUNTER — HOSPITAL ENCOUNTER (INPATIENT)
Dept: HOSPITAL 5 - 3A | Age: 43
LOS: 1 days | Discharge: HOME | DRG: 863 | End: 2018-05-09
Attending: INTERNAL MEDICINE | Admitting: INTERNAL MEDICINE
Payer: COMMERCIAL

## 2018-05-08 DIAGNOSIS — Z90.49: ICD-10-CM
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
## 2018-05-08 DIAGNOSIS — Z88.1: ICD-10-CM

## 2018-05-08 DIAGNOSIS — Z82.49: ICD-10-CM

## 2018-05-08 DIAGNOSIS — E66.01: ICD-10-CM

## 2018-05-08 DIAGNOSIS — J45.909: ICD-10-CM

## 2018-05-08 DIAGNOSIS — Z90.710: ICD-10-CM

## 2018-05-08 DIAGNOSIS — L02.211: ICD-10-CM

## 2018-05-08 DIAGNOSIS — T81.4XXA: Primary | ICD-10-CM
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
## 2018-05-08 DIAGNOSIS — D69.6: ICD-10-CM

## 2018-05-08 DIAGNOSIS — F41.9: ICD-10-CM

## 2018-05-08 DIAGNOSIS — K91.872: ICD-10-CM

## 2018-05-08 DIAGNOSIS — F10.10: ICD-10-CM

## 2018-05-08 DIAGNOSIS — J44.9: ICD-10-CM

## 2018-05-08 DIAGNOSIS — Z88.8: ICD-10-CM

## 2018-05-08 LAB
ANISOCYTOSIS BLD QL SMEAR: (no result)
BAND NEUTROPHILS # (MANUAL): 0 K/MM3
BUN SERPL-MCNC: 8 MG/DL (ref 7–17)
BUN/CREAT SERPL: 11 %
CALCIUM SERPL-MCNC: 8.4 MG/DL (ref 8.4–10.2)
HCT VFR BLD CALC: 29.3 % (ref 30.3–42.9)
HEMOLYSIS INDEX: 0
HGB BLD-MCNC: 9 GM/DL (ref 10.1–14.3)
MCH RBC QN AUTO: 21 PG (ref 28–32)
MCHC RBC AUTO-ENTMCNC: 31 % (ref 30–34)
MCV RBC AUTO: 69 FL (ref 79–97)
MYELOCYTES # (MANUAL): 0 K/MM3
OVALOCYTES BLD QL SMEAR: (no result)
PLATELET # BLD: 112 K/MM3 (ref 140–440)
PROMYELOCYTES # (MANUAL): 0 K/MM3
RBC # BLD AUTO: 4.26 M/MM3 (ref 3.65–5.03)
TOTAL CELLS COUNTED BLD: 100

## 2018-05-08 PROCEDURE — 85025 COMPLETE CBC W/AUTO DIFF WBC: CPT

## 2018-05-08 PROCEDURE — 87116 MYCOBACTERIA CULTURE: CPT

## 2018-05-08 PROCEDURE — 80048 BASIC METABOLIC PNL TOTAL CA: CPT

## 2018-05-08 PROCEDURE — 85007 BL SMEAR W/DIFF WBC COUNT: CPT

## 2018-05-08 PROCEDURE — 94640 AIRWAY INHALATION TREATMENT: CPT

## 2018-05-08 PROCEDURE — 36415 COLL VENOUS BLD VENIPUNCTURE: CPT

## 2018-05-08 PROCEDURE — 77012 CT SCAN FOR NEEDLE BIOPSY: CPT

## 2018-05-08 PROCEDURE — 10160 PNXR ASPIR ABSC HMTMA BULLA: CPT

## 2018-05-08 RX ADMIN — MORPHINE SULFATE PRN MG: 2 INJECTION, SOLUTION INTRAMUSCULAR; INTRAVENOUS at 04:20

## 2018-05-08 RX ADMIN — MORPHINE SULFATE PRN MG: 2 INJECTION, SOLUTION INTRAMUSCULAR; INTRAVENOUS at 10:41

## 2018-05-08 RX ADMIN — PIPERACILLIN AND TAZOBACTAM SCH MLS/HR: 4; .5 INJECTION, POWDER, FOR SOLUTION INTRAVENOUS at 14:10

## 2018-05-08 RX ADMIN — Medication SCH: at 10:43

## 2018-05-08 RX ADMIN — IPRATROPIUM BROMIDE AND ALBUTEROL SULFATE SCH AMPUL: .5; 3 SOLUTION RESPIRATORY (INHALATION) at 20:27

## 2018-05-08 RX ADMIN — PIPERACILLIN AND TAZOBACTAM SCH MLS/HR: 4; .5 INJECTION, POWDER, FOR SOLUTION INTRAVENOUS at 05:28

## 2018-05-08 RX ADMIN — HYDROCODONE BITARTRATE AND ACETAMINOPHEN PRN EACH: 5; 325 TABLET ORAL at 18:48

## 2018-05-08 RX ADMIN — IPRATROPIUM BROMIDE AND ALBUTEROL SULFATE SCH AMPUL: .5; 3 SOLUTION RESPIRATORY (INHALATION) at 09:11

## 2018-05-08 RX ADMIN — IPRATROPIUM BROMIDE AND ALBUTEROL SULFATE SCH AMPUL: .5; 3 SOLUTION RESPIRATORY (INHALATION) at 05:11

## 2018-05-08 RX ADMIN — IPRATROPIUM BROMIDE AND ALBUTEROL SULFATE SCH: .5; 3 SOLUTION RESPIRATORY (INHALATION) at 15:19

## 2018-05-08 RX ADMIN — Medication SCH: at 22:00

## 2018-05-08 NOTE — CAT SCAN REPORT
EXAM: CT-GUIDED aspiration of ASPIRATION OF ABDOMINAL WALL FLUID 

COLLECTION



CLINICAL INDICATION: PATIENT WITH HISTORY OF POSTOP FLUID COLLECTION



DATE: 5/8/2018



RESIDUAL COLON following an explanation of the risks, benefits and 

alternatives; written informed consent was obtained. The patient was 

brought to the CT suite and placed in supine position on the gantry. 

Initial images of the abdomen were obtained and an appropriate access 

site was chosen into the multiloculated fluid collections along the 

anterior abdominal wall. The patient's abdominal wall was prepped and 

draped in the usual sterile fashion. 1% lidocaine was used for 

anesthesia.



Using intermittent CT guidance, a 5 Chinese pigtail Yueh needle was 

advanced into the fluid collection. The trocar was removed and a total 

of 120 mL's of blood-tinged serous fluid was aspirated. No purulence 

was identified. A sample was sent for laboratory analysis. Post 

drainage images were obtained which documented near complete resolution 

of the anterior abdominal wall fluid collections. The Yueh needle was 

removed and hemostasis achieved using manual compression. A sterile 

dressing was then placed.



The patient tolerated the procedure well. There were no immediate post 

procedure complications.



Conscious sedation was performed under the guidance of radiologic 

nursing. Continuous cardiopulmonary monitoring was utilized.



IMPRESSION: 1) CT guided aspiration of abdominal wall fluid collection 

with 120 mL's of serous fluid aspirated. A sample was sent for 

laboratory analysis.

## 2018-05-08 NOTE — HISTORY AND PHYSICAL REPORT
History of Present Illness


Date of examination: 05/08/18


History of present illness: 


43-year-old woman with a history of COPD, obesity, status post hernia repair 

one month ago was transfer from [facility] today for evaluation of 

abdominal pain.  The patient had surgery one month ago for incarcerated hernia, 

she did not follow up with her surgeon.  She was seen at [facility] on 3 

different occasions for evaluation of abdominal pain.  She had a CAT scan done 

on 426 which showed fluid around the surgical site, she was discharged on 

Augmentin and she completed.  She returned on May 1 for worsening abdominal pain

, there was no change in the fluid collection, surgical consult was obtained.  

The patient had another CAT scan of the abdomen done today show possible 

abscess.  She was given 1 dose of Zosyn there.  Patient stated that she has 

been having low-grade fevers at home


Review of systems


Constitutional: no  weight loss, chills


Ears, eyes, nose, mouth and throat: no nasal congestion, no nasal discharge, no 

sinus pressure, no vision change, no red eye.


Neck: No neck pain or rigidity.


Cardiovascular: no chest pain, palpitations


Respiratory: No cough, shortness of breath


Gastrointestinal: no  hematochezia


Genitourinary : no dysuria, frequency , no hematuria


Musculoskeletal: no joint swelling or muscle ache 


Integumentary: no rash, no pruritis


Neurological: no parathesias, no numbness, no focal weakness


Endocrine: no cold or heat intolerance, no polyuria or polydipsia


Hematologic/Lymphatic: no easy bruising, no easy bleeding, no gland swelling


Allergic/Immunologic: no urticaria, no angioedema.





PAST MEDICAL HISTORY:COPD, obesity





PAST SURGICAL HISTORY: Cholecystectomy, hernia repair





SOCIAL HISTORY: Social alcohol, no tobacco or drugs





FAMILY HISTORY: Hypertension








Medications and Allergies


 Allergies











Allergy/AdvReac Type Severity Reaction Status Date / Time


 


methylprednisolone sodium Allergy  Hives Verified 04/14/16 07:55





succinate     





[From Solu-Medrol]     


 


moxifloxacin HCl Allergy  Hives Verified 04/14/16 07:55





[From Avelox]     











 Home Medications











 Medication  Instructions  Recorded  Confirmed  Last Taken  Type


 


Fluticasone/Salmeterol [Advair 1 puff IH BID #1 disk.w.dev 01/15/17 04/08/18 

Unknown Rx





Diskus 250-50 mcg]     


 


ALPRAZolam [Xanax] 1 mg PO BID 04/08/18 04/08/18 Unknown History


 


ARIPiprazole [Abilify] 5 mg PO BID 04/08/18 04/08/18 Unknown History


 


Furosemide [Lasix TAB] 20 mg PO QDAY 04/08/18 04/08/18 Unknown History


 


Omeprazole 40 mg PO QAM 04/08/18 04/08/18 Unknown History


 


buPROPion XL [Wellbutrin XL] 150 mg PO QAM 04/08/18 04/08/18 Unknown History


 


HYDROcodone/APAP  [Norco 1 each PO Q8HR PRN #12 tablet 04/12/18  Unknown 

Rx





10/325]     














Exam





- Physical Exam


Narrative exam: 


Gen. appearance: Patient lying in bed, no apparent distress


HEENT: Normocephalic, atraumatic, pupils equally round and reactive to light, 

extraocular movement intact, and no sclericterus,. No JVD or thyromegaly or 

nodule,neck supple, no carotid bruit ,mucous membranes moist, no exudate or 

erythema


Heart: S1, S2, regular rate and rhythm


Lungs: Clear to auscultation bilaterally, breathing comfortable


Abdomen: Positive bowel sounds, tender in the left lower quadrant and lower mid 

abdomen, nondistended, no organomegaly


Extremity: No edema, cyanosis, clubbing


Skin: No rash, nodules, warm, dry


Neuro: Oriented 3, cranial nerves II-12 intact, speech is fluent, motor and 

sensory intact








Assessment and Plan


CBC, Chem-7 were reviewed from [facility]





Assessment


Abdominal pain with possible underlying abscess


COPD


Morbid obesity


Thrombocytopenia





Plan


Admit to medicine


Start IV fluid, IV morphine, Zosyn, consult surgery


DVT prophylaxis

## 2018-05-08 NOTE — CONSULTATION
History of Present Illness


Consult date: 18


Chief complaint: 





abd pain





- History of present illness


History of present illness: 





44 yo F with hx of obesity, COPD, and recent Robotic assisted laparoscopic 

ventral/incisional hernia repair with mesh on 18 presents to hospital with c

/o abdominal pain. The patients was discharged on POD 2 with an unremarkable 

post op course. The patient did not follow up for post op check up and was 

called by my office 2 weeks post op to schedule a visit. At the time she 

communicated that she felt well and did not have transportation available to 

bring her to the office. She did not call the office again with any complaints 

or concerns. The patient however has been presenting to [facility] a 

total of 4 times in the last several weeks with c/o abdominal pain per ER 

physician. She has had multiple CT scans, lab work, and has been sent home with 

abx. She c/o persistent LLQ abdominal pain over the site of the hernia. Pain is 

sharp, intermittent, and does not radiate. She has been taking ibuprofen 800mg 

for the pain and states it does not help. She wore her abdominal binder for 1 

week post op. She states she was having fevers during the first post op week of 

up to 101 but has not had fevers since. 1 episode of nonbloody/nonbilious 

emesis yesterday due to pain. Patient has hx of chronic opiate use for long 

standing hx of pain from multiple abdominal wall hernias. Surgeon's at OSH 

requested patient to be transferred to [facility] where her surgery was performed.  

NO CP, SOB











Past History


Past Medical History: COPD, other (asthma, anxiety, obesity, hx of suicidal 

ideations)


Past Surgical History: , hysterectomy, hernia repair (exlap for 

incarcerated hernia), Other


Social history: alcohol abuse (social).  denies: smoking


Family history: no significant family history





Medications and Allergies


 Allergies











Allergy/AdvReac Type Severity Reaction Status Date / Time


 


methylprednisolone sodium Allergy  Hives Verified 16 07:55





succinate     





[From Solu-Medrol]     


 


moxifloxacin HCl Allergy  Hives Verified 16 07:55





[From Avelox]     











 Home Medications











 Medication  Instructions  Recorded  Confirmed  Last Taken  Type


 


Fluticasone/Salmeterol [Advair 1 puff IH BID #1 disk.w.dev 01/15/17 04/08/18 

Unknown Rx





Diskus 250-50 mcg]     


 


ALPRAZolam [Xanax] 1 mg PO BID 18 Unknown History


 


ARIPiprazole [Abilify] 5 mg PO BID 18 Unknown History


 


Furosemide [Lasix TAB] 20 mg PO QDAY 18 Unknown History


 


Omeprazole 40 mg PO QAM 18 Unknown History


 


buPROPion XL [Wellbutrin XL] 150 mg PO QAM 18 Unknown History


 


HYDROcodone/APAP  [Norco 1 each PO Q8HR PRN #12 tablet 18  Unknown 

Rx





10/325]     











Active Meds: 


Active Medications





Acetaminophen (Tylenol)  650 mg PO Q4H PRN


   PRN Reason: Pain MILD(1-3)/Fever >100.5/HA


Albuterol (Proventil)  2.5 mg IH Q4HRT PRN


   PRN Reason: Shortness Of Breath


Albuterol/Ipratropium (Duoneb *Not For Prn Use*)  1 ampul IH Q6HRT NIKA


   Last Admin: 18 09:11 Dose:  1 ampul


Sodium Chloride (Nacl 0.45% 1000 Ml)  1,000 mls @ 75 mls/hr IV AS DIRECT NIKA


   Last Admin: 18 05:20 Dose:  75 mls/hr


Piperacillin Sod/Tazobactam Sod (Zosyn/Ns 4.5gm/100ml)  4.5 gm in 100 mls @ 200 

mls/hr IV Q8HR NIKA; Protocol


   Last Admin: 18 05:28 Dose:  200 mls/hr


Morphine Sulfate (Morphine)  2 mg IV Q4H PRN


   PRN Reason: Pain, Moderate (4-6)


   Last Admin: 18 04:20 Dose:  2 mg


Ondansetron HCl (Zofran)  4 mg IV Q8H PRN


   PRN Reason: Nausea And Vomiting


   Last Admin: 18 05:20 Dose:  4 mg


Sodium Chloride (Sodium Chloride Flush Syringe 10 Ml)  10 ml IV BID NIKA


Sodium Chloride (Sodium Chloride Flush Syringe 10 Ml)  10 ml IV PRN PRN


   PRN Reason: LINE FLUSH











Review of Systems


All systems: negative (10 point ROS performed and negative except for that 

listed in HPI)





Exam


 Vital Signs











Pulse Ox


 


 100 


 


 18 04:50











Narrative exam: 





Gen: AAOx3. NAD. 


ENT: no scleral icterus or conjunctival pallor


CV: S1, S2+


Resp: even and unlabored


Abd: soft, obese, ND, + TTP supraumbilical and LLQ over bulges. No erythema, 

skin changes, cellulitis. No r/r/g. Surgical sites healed well.


Ext: no c/c/e





Results





- Labs





 18 05:42





 18 05:42


 Abnormal lab results











  18 Range/Units





  05:42 05:42 


 


Hgb  9.0 L   (10.1-14.3)  gm/dl


 


Hct  29.3 L   (30.3-42.9)  %


 


MCV  69 L   (79-97)  fl


 


MCH  21 L   (28-32)  pg


 


RDW  17.1 H   (13.2-15.2)  %


 


Plt Count  112 L   (140-440)  K/mm3


 


Seg Neuts % (Manual)  28.0 L   (40.0-70.0)  %


 


Eosinophils % (Manual)  42.0 H   (0.0-4.3)  %


 


Basophils % (Manual)  2.0 H   (0.0-1.8)  %


 


Seg Neutrophils # Man  1.5 L   (1.8-7.7)  K/mm3


 


Eosinophils # (Manual)  2.3 H   (0.0-0.4)  K/mm3


 


Potassium   3.3 L  (3.6-5.0)  mmol/L


 


Glucose   139 H  ()  mg/dL








 Diabetes panel











  18 Range/Units





  05:42 


 


Sodium  140  (137-145)  mmol/L


 


Potassium  3.3 L  (3.6-5.0)  mmol/L


 


Chloride  101.4  ()  mmol/L


 


Carbon Dioxide  26  (22-30)  mmol/L


 


BUN  8  (7-17)  mg/dL


 


Creatinine  0.7  (0.7-1.2)  mg/dL


 


Glucose  139 H  ()  mg/dL


 


Calcium  8.4  (8.4-10.2)  mg/dL








 Calcium panel











  18 Range/Units





  05:42 


 


Calcium  8.4  (8.4-10.2)  mg/dL








 Pituitary panel











  18 Range/Units





  05:42 


 


Sodium  140  (137-145)  mmol/L


 


Potassium  3.3 L  (3.6-5.0)  mmol/L


 


Chloride  101.4  ()  mmol/L


 


Carbon Dioxide  26  (22-30)  mmol/L


 


BUN  8  (7-17)  mg/dL


 


Creatinine  0.7  (0.7-1.2)  mg/dL


 


Glucose  139 H  ()  mg/dL


 


Calcium  8.4  (8.4-10.2)  mg/dL








 Adrenal panel











  18 Range/Units





  05:42 


 


Sodium  140  (137-145)  mmol/L


 


Potassium  3.3 L  (3.6-5.0)  mmol/L


 


Chloride  101.4  ()  mmol/L


 


Carbon Dioxide  26  (22-30)  mmol/L


 


BUN  8  (7-17)  mg/dL


 


Creatinine  0.7  (0.7-1.2)  mg/dL


 


Glucose  139 H  ()  mg/dL


 


Calcium  8.4  (8.4-10.2)  mg/dL














- Imaging


CT scan - abdomen: report reviewed, image reviewed


CT scan - pelvis: report reviewed, image reviewed





Assessment and Plan





44 yo F with abdominal pain, abdominal wall fluid collections.





CT A/P reviewed from OSH. Multiple fluid collections of abdominal wall. These 

areas are consistent with patient's multiple hernia defects. Collections have 

no air in them and appears consistent with post op seromas. The patient has 

been afebrile and WBC is normal without shift. Doubt that fluid collections are 

infected or represent abscess, more likely seromas. I discussed conservative 

treatment with the patient which will include pain control, abdominal binder, 

and time to allow seromas to reabsorb. However, she states that she is in too 

much pain. Other option is to consult IR to drain symptomatic area and see if 

she gets relief. However, I explained to the patient that these fluid 

collections are likely sterile and draining them can introduce infection and 

infect the mesh, leading to repeat surgery to remove mesh and other surgical 

procedures. She understands and is agreeable to having IR consult to evaluate 

for drainage.





1. PRN pain control


2. NPO


3. IR consult


4. IVF


5. strict glucose control


6. abdominal binder





Thank you for this consultation, please call with questions or concerns.

## 2018-05-09 VITALS — DIASTOLIC BLOOD PRESSURE: 71 MMHG | SYSTOLIC BLOOD PRESSURE: 131 MMHG

## 2018-05-09 RX ADMIN — Medication SCH ML: at 10:05

## 2018-05-09 RX ADMIN — HYDROCODONE BITARTRATE AND ACETAMINOPHEN PRN EACH: 5; 325 TABLET ORAL at 07:23

## 2018-05-09 RX ADMIN — IPRATROPIUM BROMIDE AND ALBUTEROL SULFATE SCH AMPUL: .5; 3 SOLUTION RESPIRATORY (INHALATION) at 07:36

## 2018-05-09 RX ADMIN — PIPERACILLIN AND TAZOBACTAM SCH MLS/HR: 4; .5 INJECTION, POWDER, FOR SOLUTION INTRAVENOUS at 01:56

## 2018-05-09 RX ADMIN — PIPERACILLIN AND TAZOBACTAM SCH MLS/HR: 4; .5 INJECTION, POWDER, FOR SOLUTION INTRAVENOUS at 14:02

## 2018-05-09 RX ADMIN — IPRATROPIUM BROMIDE AND ALBUTEROL SULFATE SCH AMPUL: .5; 3 SOLUTION RESPIRATORY (INHALATION) at 02:35

## 2018-05-09 RX ADMIN — PIPERACILLIN AND TAZOBACTAM SCH MLS/HR: 4; .5 INJECTION, POWDER, FOR SOLUTION INTRAVENOUS at 07:07

## 2018-05-09 RX ADMIN — IPRATROPIUM BROMIDE AND ALBUTEROL SULFATE SCH AMPUL: .5; 3 SOLUTION RESPIRATORY (INHALATION) at 13:41

## 2018-05-09 RX ADMIN — HYDROCODONE BITARTRATE AND ACETAMINOPHEN PRN EACH: 5; 325 TABLET ORAL at 12:43

## 2018-05-09 NOTE — DISCHARGE SUMMARY
Providers





- Providers


Date of Admission: 


05/08/18 03:51





Attending physician: 


TATIANA AMOR MD





 





05/08/18 03:43


Consult to Physician [CONS] Routine 


   Comment: 


   Consulting Provider: TOM PERDOMO


   Physician Instructions: 


   Reason For Exam: abd pain





05/08/18 10:07


Consult to Interventional Radiology [CONS] Routine 


   Consulting Provider: KADEEM ARORA


   Reason For Exam: abdominal wall fluid collections, s/p hernia repai


   Place consult to:: DR. ARORA


   Notified:: [facility]SWERING SERVICES


   Phone number called:: [phone]


   Was contact made?: Yes


   If yes, spoke with:: PHILIP


   Time called:: 07:41


   Comment:: CONSULT COMPLETED - [place]











Primary care physician: 


JENNIFER LANDIS








Hospitalization


Hospital course: 





CBC, Chem-7 were reviewed from [facility]





Assessment


Abdominal pain with possible underlying abscess


COPD


Morbid obesity


Thrombocytopenia





Plan


Admit to medicine


Start IV fluid, IV morphine, Zosyn, consult surgery


DVT prophylaxis





Disposition: DC-01 TO HOME OR SELFCARE


Time spent for discharge: 33 minutes





Core Measure Documentation





- Palliative Care


Palliative Care/ Comfort Measures: Not Applicable





- Core Measures


Any of the following diagnoses?: none





Exam





- Constitutional


Vitals: 


 











Temp Pulse Resp BP Pulse Ox


 


 99.4 F   95 H  20   108/60   94 


 


 05/09/18 12:00  05/09/18 13:51  05/09/18 13:51  05/09/18 12:00  05/09/18 12:00











General appearance: Present: no acute distress, well-nourished





- EENT


Eyes: Present: PERRL


ENT: hearing intact, clear oral mucosa





- Neck


Neck: Present: supple, normal ROM





- Respiratory


Respiratory effort: normal


Respiratory: bilateral: CTA





- Cardiovascular


Heart Sounds: Present: S1 & S2.  Absent: rub, click





- Extremities


Extremities: pulses symmetrical, No edema


Peripheral Pulses: within normal limits





- Abdominal


General gastrointestinal: Present: soft, non-tender, non-distended, normal 

bowel sounds


Female genitourinary: Present: normal





- Integumentary


Integumentary: Present: clear, warm, dry





- Musculoskeletal


Musculoskeletal: gait normal, strength equal bilaterally





- Psychiatric


Psychiatric: appropriate mood/affect, intact judgment & insight





- Neurologic


Neurologic: CNII-XII intact, moves all extremities





Plan


Follow up with: 


JENNIFER LANDIS MD [Primary Care Provider] - 7 Days


Prescriptions: 


HYDROcodone/APAP  [Norco  mg TAB] 1 each PO Q8HR PRN #20 tablet


 PRN Reason: Pain

## 2018-07-28 ENCOUNTER — HOSPITAL ENCOUNTER (EMERGENCY)
Dept: HOSPITAL 5 - ED | Age: 43
LOS: 1 days | Discharge: HOME | End: 2018-07-29
Payer: SELF-PAY

## 2018-07-28 VITALS — DIASTOLIC BLOOD PRESSURE: 87 MMHG | SYSTOLIC BLOOD PRESSURE: 125 MMHG

## 2018-07-28 DIAGNOSIS — S31.114D: ICD-10-CM

## 2018-07-28 DIAGNOSIS — Z48.01: Primary | ICD-10-CM

## 2018-07-28 NOTE — EMERGENCY DEPARTMENT REPORT
ED General Adult HPI





- General


Chief complaint: Medical Clearance


Stated complaint: DRAIN REMOVED


Source: patient


Mode of arrival: Ambulatory


Limitations: No Limitations





- History of Present Illness


Initial comments: 





Patient was sent to the emergency room by her surgeon Dr Perdomo to remove the 

pigtail catheter on the subcutaneous part of the lower left abdomen.


-: Gradual


Location: abdomen


Radiation: non-radiation


Severity scale (0 -10): 0


Consistency: constant


Improves with: none


Worsens with: none


Associated Symptoms: denies other symptoms


Treatments Prior to Arrival: none





- Related Data


 Previous Rx's











 Medication  Instructions  Recorded  Last Taken  Type


 


HYDROcodone/APAP 5-325 [Norco 1 each PO Q6H PRN #10 tablet 18 Unknown Rx





5-325 mg TAB]    











 Allergies











Allergy/AdvReac Type Severity Reaction Status Date / Time


 


methylprednisolone Allergy  Hives Verified 18 11:45





[From Solu-Medrol]     


 


moxifloxacin [From Avelox] Allergy  Hives Verified 18 11:45














ED Review of Systems


ROS: 


Stated complaint: DRAIN REMOVED


Other details as noted in HPI





Comment: All other systems reviewed and negative


Constitutional: no symptoms reported


Eyes: denies: eye pain


ENT: denies: ear pain


Respiratory: denies: cough, shortness of breath


Cardiovascular: denies: chest pain, palpitations, edema, syncope


Endocrine: no symptoms reported


Gastrointestinal: denies: abdominal pain, nausea, vomiting, diarrhea


Genitourinary: denies: urgency, dysuria


Musculoskeletal: denies: back pain


Skin: denies: rash, lesions


Neurological: denies: headache, weakness, numbness


Psychiatric: denies: anxiety, depression


Hematological/Lymphatic: denies: easy bleeding, easy bruising





ED Past Medical Hx





- Past Medical History


Hx COPD: Yes





- Surgical History


Hx Cholecystectomy: Yes


Additional Surgical History: hernia repair .  





- Social History


Smoking Status: Never Smoker


Substance Use Type: Alcohol





- Medications


Home Medications: 


 Home Medications











 Medication  Instructions  Recorded  Confirmed  Last Taken  Type


 


HYDROcodone/APAP 5-325 [Norco 1 each PO Q6H PRN #10 tablet 18  Unknown Rx





5-325 mg TAB]     














ED Physical Exam





- General


Limitations: No Limitations


General appearance: alert, in no apparent distress





- Head


Head exam: Present: atraumatic, normocephalic, normal inspection





- Eye


Eye exam: Present: normal appearance, PERRL, EOMI


Pupils: Present: normal accommodation





- ENT


ENT exam: Present: normal exam, normal orophraynx, mucous membranes moist





- Neck


Neck exam: Present: normal inspection, full ROM.  Absent: tenderness





- Respiratory


Respiratory exam: Present: normal lung sounds bilaterally.  Absent: respiratory 

distress, wheezes, rales, rhonchi, stridor





- Cardiovascular


Cardiovascular Exam: Present: regular rate, normal rhythm, normal heart sounds





- GI/Abdominal


GI/Abdominal exam: Present: soft, normal bowel sounds.  Absent: distended, 

tenderness, guarding, rebound





- Extremities Exam


Extremities exam: Present: normal inspection, full ROM, normal capillary refill





- Back Exam


Back exam: Present: normal inspection, full ROM.  Absent: tenderness





- Neurological Exam


Neurological exam: Present: alert, oriented X3, CN II-XII intact





- Psychiatric


Psychiatric exam: Present: normal affect, normal mood





- Skin


Skin exam: Present: warm, dry, intact, normal color.  Absent: rash





ED Course


 Vital Signs











  18





  18:23 22:59 23:27


 


Temperature 99.5 F 99.2 F 


 


Pulse Rate 73 78 63


 


Respiratory 18 14 23





Rate   


 


Blood Pressure 148/79 148/84 125/87


 


O2 Sat by Pulse 99 98 100





Oximetry   














  18





  23:35


 


Temperature 


 


Pulse Rate 


 


Respiratory 18





Rate 


 


Blood Pressure 


 


O2 Sat by Pulse 99





Oximetry 














- Reevaluation(s)


Reevaluation #1: 





18 00:01


I called and spoke with Dr JOAN Perdomo and she confirmed that she wants to pig 

tail catheter removed and patient discharged home to follow up with her in her 

out patient clinic.





ED Medical Decision Making





- Medical Decision Making





Surgical Drain Removal.


Critical care attestation.: 


If time is entered above; I have spent that time in minutes in the direct care 

of this critically ill patient, excluding procedure time.








ED Disposition


Clinical Impression: 


 Visit for suture removal





Disposition: - TO HOME OR SELFCARE


Is pt being admited?: No


Does the pt Need Aspirin: No


Condition: Stable


Additional Instructions: 


Please follow up with Dr Perdomo on Monday. Return to the ED if your condition 

worsens.


Referrals: 


PRIMARY CARE,MD [Primary Care Provider] - 3-5 Days


TOM PERDOMO DO [Staff Physician] - 3-5 Days


Time of Disposition: 00:08

## 2018-07-28 NOTE — EVENT NOTE
Date: 07/28/18





Received call from consulting general surgeon, Dr. PETER Sexton.  Patient has 

failed to follow-up, and the general surgeon recommends removal of the pigtail 

catheter as long as no obvious contraindications exist.  She is available for 

phone or er consultation if necessary.

## 2020-04-11 ENCOUNTER — HOSPITAL ENCOUNTER (EMERGENCY)
Dept: HOSPITAL 5 - ED | Age: 45
Discharge: HOME | End: 2020-04-11
Payer: SELF-PAY

## 2020-04-11 VITALS — DIASTOLIC BLOOD PRESSURE: 74 MMHG | SYSTOLIC BLOOD PRESSURE: 124 MMHG

## 2020-04-11 DIAGNOSIS — H10.9: Primary | ICD-10-CM

## 2020-04-11 DIAGNOSIS — L03.213: ICD-10-CM

## 2020-04-11 DIAGNOSIS — Z98.890: ICD-10-CM

## 2020-04-11 DIAGNOSIS — Z90.49: ICD-10-CM

## 2020-04-11 DIAGNOSIS — E66.9: ICD-10-CM

## 2020-04-11 DIAGNOSIS — Z79.899: ICD-10-CM

## 2020-04-11 DIAGNOSIS — Z88.6: ICD-10-CM

## 2020-04-11 DIAGNOSIS — Z88.8: ICD-10-CM

## 2020-04-11 DIAGNOSIS — J44.9: ICD-10-CM

## 2020-04-11 PROCEDURE — 99282 EMERGENCY DEPT VISIT SF MDM: CPT

## 2020-05-16 ENCOUNTER — HOSPITAL ENCOUNTER (EMERGENCY)
Dept: HOSPITAL 5 - ED | Age: 45
LOS: 1 days | Discharge: HOME | End: 2020-05-17
Payer: SELF-PAY

## 2020-05-16 DIAGNOSIS — N76.0: Primary | ICD-10-CM

## 2020-05-16 DIAGNOSIS — K43.2: ICD-10-CM

## 2020-05-16 DIAGNOSIS — B96.89: ICD-10-CM

## 2020-05-16 PROCEDURE — 96361 HYDRATE IV INFUSION ADD-ON: CPT

## 2020-05-16 PROCEDURE — 87086 URINE CULTURE/COLONY COUNT: CPT

## 2020-05-16 PROCEDURE — 96374 THER/PROPH/DIAG INJ IV PUSH: CPT

## 2020-05-16 PROCEDURE — 84703 CHORIONIC GONADOTROPIN ASSAY: CPT

## 2020-05-16 PROCEDURE — 81001 URINALYSIS AUTO W/SCOPE: CPT

## 2020-05-16 PROCEDURE — 85025 COMPLETE CBC W/AUTO DIFF WBC: CPT

## 2020-05-16 PROCEDURE — 83690 ASSAY OF LIPASE: CPT

## 2020-05-16 PROCEDURE — 96375 TX/PRO/DX INJ NEW DRUG ADDON: CPT

## 2020-05-16 PROCEDURE — 36415 COLL VENOUS BLD VENIPUNCTURE: CPT

## 2020-05-16 PROCEDURE — 99284 EMERGENCY DEPT VISIT MOD MDM: CPT

## 2020-05-16 PROCEDURE — 80048 BASIC METABOLIC PNL TOTAL CA: CPT

## 2020-05-16 PROCEDURE — 80076 HEPATIC FUNCTION PANEL: CPT

## 2020-05-16 PROCEDURE — 74177 CT ABD & PELVIS W/CONTRAST: CPT

## 2020-05-16 PROCEDURE — 96376 TX/PRO/DX INJ SAME DRUG ADON: CPT

## 2020-05-17 VITALS — SYSTOLIC BLOOD PRESSURE: 124 MMHG | DIASTOLIC BLOOD PRESSURE: 98 MMHG

## 2020-05-17 LAB
ALBUMIN SERPL-MCNC: 4.1 G/DL (ref 3.9–5)
ALT SERPL-CCNC: 10 UNITS/L (ref 7–56)
BASOPHILS # (AUTO): 0 K/MM3 (ref 0–0.1)
BASOPHILS NFR BLD AUTO: 0.5 % (ref 0–1.8)
BILIRUB DIRECT SERPL-MCNC: < 0.2 MG/DL (ref 0–0.2)
BILIRUB UR QL STRIP: (no result)
BLOOD UR QL VISUAL: (no result)
BUN SERPL-MCNC: 8 MG/DL (ref 7–17)
BUN/CREAT SERPL: 11 %
CALCIUM SERPL-MCNC: 9.3 MG/DL (ref 8.4–10.2)
EOSINOPHIL # BLD AUTO: 1 K/MM3 (ref 0–0.4)
EOSINOPHIL NFR BLD AUTO: 14 % (ref 0–4.3)
HCT VFR BLD CALC: 38.3 % (ref 30.3–42.9)
HEMOLYSIS INDEX: 4
HGB BLD-MCNC: 11.9 GM/DL (ref 10.1–14.3)
LYMPHOCYTES # BLD AUTO: 2 K/MM3 (ref 1.2–5.4)
LYMPHOCYTES NFR BLD AUTO: 27.8 % (ref 13.4–35)
MCHC RBC AUTO-ENTMCNC: 31 % (ref 30–34)
MCV RBC AUTO: 75 FL (ref 79–97)
MONOCYTES # (AUTO): 0.6 K/MM3 (ref 0–0.8)
MONOCYTES % (AUTO): 7.8 % (ref 0–7.3)
MUCOUS THREADS #/AREA URNS HPF: (no result) /HPF
PH UR STRIP: 5 [PH] (ref 5–7)
PLATELET # BLD: 151 K/MM3 (ref 140–440)
PROT UR STRIP-MCNC: (no result) MG/DL
RBC # BLD AUTO: 5.07 M/MM3 (ref 3.65–5.03)
RBC #/AREA URNS HPF: 8 /HPF (ref 0–6)
UROBILINOGEN UR-MCNC: 2 MG/DL (ref ?–2)
WBC #/AREA URNS HPF: 16 /HPF (ref 0–6)

## 2020-05-17 NOTE — EMERGENCY DEPARTMENT REPORT
ED Abdominal Pain HPI





- General


Chief Complaint: Abdominal Pain


Stated Complaint: ABDOMINAL PAIN


Time Seen by Provider: 20 00:47


Source: patient, EMS


Mode of arrival: Ambulatory


Limitations: No Limitations





- History of Present Illness


Initial Comments: 





45-year-old female with a past medical history of chronic abdominal pain, 

multiple abdominal hernias status post repair, COPD, asthma, and morbid obesity 

presents to the hospital complains of worsening abdominal pain since hospital 

discharge on 5/3 and nausea, vomiting, and diarrhea since 4 PM yesterday.  Pain 

is worse at the right upper/mid abdomen area of chronic hernia.  Patient has 

multiple abdominal hernias with repairs.  Last hernia repair was in 2018 with 

Dr. Sexton  Patient was here on the third with similar symptoms and had a CT 

abdomen pelvis performed.  I discussed case with Dr. Sexton shortly after  

patient's evaluation and she states patient has been urged to go to [facility] given 

that she has recurrent hernias and is a complex case.  Patient denies fever.  

Pain is not alleviated with Percocet 5/325 at home which was prescribed during 

her recent visit. Pt did not fill the prescribed zofran. Patient has been 

informed to go to pain management in the past


Severity scale (0 -10): 10





- Related Data


                                Home Medications











 Medication  Instructions  Recorded  Confirmed  Last Taken


 


ALPRAZolam [Xanax] 1 mg PO BID 18 Unknown


 


ARIPiprazole [Abilify] 5 mg PO BID 18 Unknown


 


Furosemide [Lasix TAB] 20 mg PO QDAY 18 Unknown


 


Omeprazole 40 mg PO QAM 18 Unknown


 


buPROPion XL [Wellbutrin XL] 150 mg PO QAM 18 Unknown








                                  Previous Rx's











 Medication  Instructions  Recorded  Last Taken  Type


 


Fluticasone/Salmeterol [Advair 1 puff IH BID #1 disk.w.dev 01/15/17 Unknown Rx





Diskus 250-50 mcg]    


 


HYDROcodone/APAP  [Norco 1 each PO Q8HR PRN #20 tablet 18 Unknown Rx





 mg TAB]    


 


HYDROcodone/APAP 5-325 [Norco 1 each PO Q6H PRN #10 tablet 18 Unknown Rx





5-325 mg TAB]    


 


Clindamycin [Clindamycin CAP] 150 mg PO Q6HR #40 capsule 20 Unknown Rx


 


Tobramycin [Tobrex] 1 drop OP Q4H #1 bottle 20 Unknown Rx


 


Ondansetron [Zofran Odt] 4 mg PO Q8HR PRN #20 tab.rapdis 20 Unknown Rx


 


oxyCODONE /ACETAMINOPHEN [Percocet 1 tab PO Q6HR PRN #7 tablet 20 Unknown 

Rx





5/325]    


 


Fluconazole (Nf) [Diflucan TAB] 150 mg PO ONCE #1 tablet 20 Unknown Rx


 


Ondansetron [Zofran Odt] 4 mg PO Q8HR PRN #20 tab.rapdis 20 Unknown Rx


 


Oxycodone HCl/Acetaminophen 1 each PO Q6HR PRN #15 tablet 20 Unknown Rx





[Percocet 10/325 mg]    











                                    Allergies











Allergy/AdvReac Type Severity Reaction Status Date / Time


 


ibuprofen Allergy  Shortness Verified 20 18:08





   of Breath  


 


methylprednisolone Allergy  Hives Verified 18 11:45





[From Solu-Medrol]     


 


methylprednisolone sodium Allergy  Hives Verified 16 07:55





succinate     





[From Solu-Medrol]     


 


moxifloxacin [From Avelox] Allergy  Hives Verified 18 11:45


 


moxifloxacin HCl Allergy  Hives Verified 16 07:55





[From Avelox]     


 


sulfamethoxazole Allergy  Shortness Verified 20 18:08





[From Bactrim]   of Breath  


 


trimethoprim [From Bactrim] Allergy  Shortness Verified 20 18:08





   of Breath  














ED Review of Systems


ROS: 


Stated complaint: ABDOMINAL PAIN


Other details as noted in HPI





Comment: All other systems reviewed and negative





ED Past Medical Hx





- Past Medical History


Hx Hypertension: No


Hx Congestive Heart Failure: No


Hx Diabetes: No


Hx Sickle Cell Disease: No


Hx Arthritis:  (intubated x 4)


Hx Seizures: No


Hx Asthma: Yes


Hx COPD: Yes (intubated x 4)


Hx HIV: No


Additional medical history: OBESITY.  HERNIA X 2.  ROMANA





- Surgical History


Hx Cholecystectomy: Yes


Additional Surgical History: hernia repair .  





- Social History


Smoking Status: Never Smoker


Substance Use Type: Alcohol





- Medications


Home Medications: 


                                Home Medications











 Medication  Instructions  Recorded  Confirmed  Last Taken  Type


 


Fluticasone/Salmeterol [Advair 1 puff IH BID #1 disk.w.dev 01/15/17 04/08/18 

Unknown Rx





Diskus 250-50 mcg]     


 


ALPRAZolam [Xanax] 1 mg PO BID 18 Unknown History


 


ARIPiprazole [Abilify] 5 mg PO BID 18 Unknown History


 


Furosemide [Lasix TAB] 20 mg PO QDAY 18 Unknown History


 


Omeprazole 40 mg PO QAM 18 Unknown History


 


buPROPion XL [Wellbutrin XL] 150 mg PO QAM 18 Unknown History


 


HYDROcodone/APAP  [Norco 1 each PO Q8HR PRN #20 tablet 18  Unknown 

Rx





 mg TAB]     


 


HYDROcodone/APAP 5-325 [Norco 1 each PO Q6H PRN #10 tablet 18  Unknown Rx





5-325 mg TAB]     


 


Clindamycin [Clindamycin CAP] 150 mg PO Q6HR #40 capsule 20  Unknown Rx


 


Tobramycin [Tobrex] 1 drop OP Q4H #1 bottle 20  Unknown Rx


 


Ondansetron [Zofran Odt] 4 mg PO Q8HR PRN #20 tab.rapdis 20  Unknown Rx


 


oxyCODONE /ACETAMINOPHEN [Percocet 1 tab PO Q6HR PRN #7 tablet 20  Unknown

 Rx





5/325]     


 


Fluconazole (Nf) [Diflucan TAB] 150 mg PO ONCE #1 tablet 20  Unknown Rx


 


Ondansetron [Zofran Odt] 4 mg PO Q8HR PRN #20 tab.rapdis 20  Unknown Rx


 


Oxycodone HCl/Acetaminophen 1 each PO Q6HR PRN #15 tablet 20  Unknown Rx





[Percocet 10/325 mg]     














ED Physical Exam





- General


Limitations: No Limitations





- Other


Other exam information: 





General: Moderate distress secondary to pain


Head: Atraumatic


Eyes: normal appearance


ENT: Moist mucous membranes


Neck: Normal appearance, no midline tenderness


Chest: Bilateral wheezing


CV: Regular rate and rhythm


Abdomen: Soft, normal bowel sounds multiple abdominal surgeries and multiple 

ventral wall hernias.  Tenderness to the right abdomen at area of herniation.  

Patient states that the hernia is bigger than when she presented on May 30


Back: Normal inspection


Extremity: Normal inspection, full range of motion


Neuro: Alert O x 3, no facial asymmetry, speech clear, no gross motor sensory 

deficit


Psych: Appropriate behavior


Skin: No rash





ED Course


                                   Vital Signs











  20





  23:40 03:31 03:33


 


Temperature 99.2 F  98.2 F


 


Pulse Rate 98 H 94 H 


 


Respiratory 18  





Rate   


 


Blood Pressure 165/100 124/98 


 


O2 Sat by Pulse 95 100 





Oximetry   














- Consultations


Consultation #1: 





20 05:55


case we discussed with Dr. Sexton.  This patient does not have any evidence of 

obstruction or acute surgical pathology she would not be a candidate for 

emergent transfer to [facility].  She again advises outpatient follow-up with [facility] 

surgery.





ED Medical Decision Making





- Lab Data


Result diagrams: 


                                 20 00:06





                                 20 00:06








                                   Lab Results











  20 Range/Units





  00:06 00:06 00:50 


 


WBC  7.3    (4.5-11.0)  K/mm3


 


RBC  5.07 H    (3.65-5.03)  M/mm3


 


Hgb  11.9    (10.1-14.3)  gm/dl


 


Hct  38.3    (30.3-42.9)  %


 


MCV  75 L    (79-97)  fl


 


MCH  23 L    (28-32)  pg


 


MCHC  31    (30-34)  %


 


RDW  15.8 H    (13.2-15.2)  %


 


Plt Count  151    (140-440)  K/mm3


 


Lymph % (Auto)  27.8    (13.4-35.0)  %


 


Mono % (Auto)  7.8 H    (0.0-7.3)  %


 


Eos % (Auto)  14.0 H    (0.0-4.3)  %


 


Baso % (Auto)  0.5    (0.0-1.8)  %


 


Lymph #  2.0    (1.2-5.4)  K/mm3


 


Mono #  0.6    (0.0-0.8)  K/mm3


 


Eos #  1.0 H    (0.0-0.4)  K/mm3


 


Baso #  0.0    (0.0-0.1)  K/mm3


 


Seg Neutrophils %  49.9    (40.0-70.0)  %


 


Seg Neutrophils #  3.7    (1.8-7.7)  K/mm3


 


Sodium   140   (137-145)  mmol/L


 


Potassium   3.8   (3.6-5.0)  mmol/L


 


Chloride   99.3   ()  mmol/L


 


Carbon Dioxide   28   (22-30)  mmol/L


 


Anion Gap   17   mmol/L


 


BUN   8   (7-17)  mg/dL


 


Creatinine   0.7   (0.7-1.2)  mg/dL


 


Estimated GFR   > 60   ml/min


 


BUN/Creatinine Ratio   11   %


 


Glucose   168 H   ()  mg/dL


 


Calcium   9.3   (8.4-10.2)  mg/dL


 


Total Bilirubin     (0.1-1.2)  mg/dL


 


Direct Bilirubin     (0-0.2)  mg/dL


 


Indirect Bilirubin     mg/dL


 


AST     (5-40)  units/L


 


ALT     (7-56)  units/L


 


Alkaline Phosphatase     ()  units/L


 


Total Protein     (6.3-8.2)  g/dL


 


Albumin     (3.9-5)  g/dL


 


Albumin/Globulin Ratio     %


 


Lipase     (13-60)  units/L


 


HCG, Qual     (Negative)  


 


Urine Color    Odilia  (Yellow)  


 


Urine Turbidity    Cloudy  (Clear)  


 


Urine pH    5.0  (5.0-7.0)  


 


Ur Specific Gravity    1.027  (1.003-1.030)  


 


Urine Protein    <15 mg/dl  (Negative)  mg/dL


 


Urine Glucose (UA)    Neg  (Negative)  mg/dL


 


Urine Ketones    Neg  (Negative)  mg/dL


 


Urine Blood    Neg  (Negative)  


 


Urine Nitrite    Neg  (Negative)  


 


Urine Bilirubin    Neg  (Negative)  


 


Urine Urobilinogen    2.0  (<2.0)  mg/dL


 


Ur Leukocyte Esterase    Mod  (Negative)  


 


Urine WBC (Auto)    16.0 H  (0.0-6.0)  /HPF


 


Urine RBC (Auto)    8.0  (0.0-6.0)  /HPF


 


U Epithel Cells (Auto)    70.0 H  (0-13.0)  /HPF


 


Urine Mucus    3+  /HPF


 


Urine Yeast (Budding)    Few  /HPF














  20 Range/Units





  01:22 01:22 


 


WBC    (4.5-11.0)  K/mm3


 


RBC    (3.65-5.03)  M/mm3


 


Hgb    (10.1-14.3)  gm/dl


 


Hct    (30.3-42.9)  %


 


MCV    (79-97)  fl


 


MCH    (28-32)  pg


 


MCHC    (30-34)  %


 


RDW    (13.2-15.2)  %


 


Plt Count    (140-440)  K/mm3


 


Lymph % (Auto)    (13.4-35.0)  %


 


Mono % (Auto)    (0.0-7.3)  %


 


Eos % (Auto)    (0.0-4.3)  %


 


Baso % (Auto)    (0.0-1.8)  %


 


Lymph #    (1.2-5.4)  K/mm3


 


Mono #    (0.0-0.8)  K/mm3


 


Eos #    (0.0-0.4)  K/mm3


 


Baso #    (0.0-0.1)  K/mm3


 


Seg Neutrophils %    (40.0-70.0)  %


 


Seg Neutrophils #    (1.8-7.7)  K/mm3


 


Sodium    (137-145)  mmol/L


 


Potassium    (3.6-5.0)  mmol/L


 


Chloride    ()  mmol/L


 


Carbon Dioxide    (22-30)  mmol/L


 


Anion Gap    mmol/L


 


BUN    (7-17)  mg/dL


 


Creatinine    (0.7-1.2)  mg/dL


 


Estimated GFR    ml/min


 


BUN/Creatinine Ratio    %


 


Glucose    ()  mg/dL


 


Calcium    (8.4-10.2)  mg/dL


 


Total Bilirubin  0.20   (0.1-1.2)  mg/dL


 


Direct Bilirubin  < 0.2   (0-0.2)  mg/dL


 


Indirect Bilirubin  0.0   mg/dL


 


AST  14   (5-40)  units/L


 


ALT  10   (7-56)  units/L


 


Alkaline Phosphatase  58   ()  units/L


 


Total Protein  7.4   (6.3-8.2)  g/dL


 


Albumin  4.1   (3.9-5)  g/dL


 


Albumin/Globulin Ratio  1.2   %


 


Lipase  17   (13-60)  units/L


 


HCG, Qual   Negative  (Negative)  


 


Urine Color    (Yellow)  


 


Urine Turbidity    (Clear)  


 


Urine pH    (5.0-7.0)  


 


Ur Specific Gravity    (1.003-1.030)  


 


Urine Protein    (Negative)  mg/dL


 


Urine Glucose (UA)    (Negative)  mg/dL


 


Urine Ketones    (Negative)  mg/dL


 


Urine Blood    (Negative)  


 


Urine Nitrite    (Negative)  


 


Urine Bilirubin    (Negative)  


 


Urine Urobilinogen    (<2.0)  mg/dL


 


Ur Leukocyte Esterase    (Negative)  


 


Urine WBC (Auto)    (0.0-6.0)  /HPF


 


Urine RBC (Auto)    (0.0-6.0)  /HPF


 


U Epithel Cells (Auto)    (0-13.0)  /HPF


 


Urine Mucus    /HPF


 


Urine Yeast (Budding)    /HPF














- Radiology Data


Radiology results: report reviewed





CT abdomen pelvis w con INDICATION: Ventral hernia, nausea, vomiting, pain 

TECHNIQUE: All CT scans at this location are performed using the following dose 

modulation technique: Automated exposure control. Helical slices were obtained 

through the abdomen and pelvis. 100 cc of Omnipaque 300 is administered. Oral 

contrast is administered. COMPARISON: CT scan dated 5/3/2020 FINDINGS: Abdomen: 

The no acute abnormality is seen in the lower chest. The liver, spleen, 

pancreas, adrenal glands, and kidneys are unchanged in appearance. There is 

nephrolithiasis on the right. There is no obstruction or inflammation. Large 

ventral hernia and eventration of the abdominal wall are again noted and appear 

unchanged from the recent CT. Pelvis: There is no obstruction or inflammation. 

There are no abnormal fluid collections. On review of bone windows, no acute 

osseous abnormalities are seen. IMPRESSION: 1. There is eventration of the 

anterior abdominal wall with a large ventral hernia containing bowel. This 

appears unchanged from 5/3/2020. There is no obstruction. There is no free air. 





- Medical Decision Making





Patient has recurrent chronic abdominal pain related to ventral hernia.  CT 

abdomen pelvis today performed with p.o. and IV contrast and does not show any 

signs of obstruction. BP improved with pain reduction.  Case discussed with Dr. Sexton who once again advised patient to follow-up with him as outpatient.  She 

would not be a candidate for emergent transfer at this time given that she does 

not have any acute indication for surgery.  Patient will need to be compliant 

and follow-up with them as outpatient.





Patient will be encouraged to fill her Zofran prescription and provided 

additional Zofran as needed.  She will also be prescribed additional Percocet 

tablets











I was unable to pull up any recently filled prescriptions on the [place] 

prescription monitoring site.  Last prescription filled on the site was tramadol

in 2019. 


Critical Care Time: No


Critical care attestation.: 


If time is entered above; I have spent that time in minutes in the direct care 

of this critically ill patient, excluding procedure time.








ED Disposition


Clinical Impression: 


 Ventral hernia, recurrent, Nausea and vomiting, Yeast vaginitis





Disposition:  TO HOME OR SELFCARE


Is pt being admited?: No


Condition: Stable


Instructions:  Vulvovaginal Candidiasis (ED), Ventral Hernia (ED)


Additional Instructions: 


Take the medication as prescribed.  Follow-up with your doctor or doctor/clinic 

provided.  Return if symptoms worsen as indicated by your discharge 

instructions.


Prescriptions: 


Fluconazole (Nf) [Diflucan TAB] 150 mg PO ONCE #1 tablet


Oxycodone HCl/Acetaminophen [Percocet 10/325 mg] 1 each PO Q6HR PRN #15 tablet


 PRN Reason: Pain


Ondansetron [Zofran Odt] 4 mg PO Q8HR PRN #20 tab.rapdis


 PRN Reason: Nausea And Vomiting


Referrals: 


PRIMARY CARE,MD [Primary Care Provider] - 3-5 Days


[facility] md mateo [Other] - 3-5 Days (call the number provided to follow 

up with the [facility])


Time of Disposition: 06:07

## 2020-05-17 NOTE — CAT SCAN REPORT
CT abdomen pelvis w con



INDICATION:

Ventral hernia, nausea, vomiting, pain



TECHNIQUE:

All CT scans at this location are performed using the following dose modulation technique: Automated 
exposure control. Helical slices were obtained through the abdomen and pelvis. 100 cc of Omnipaque 30
0 is administered. Oral contrast is administered. 



COMPARISON:

CT scan dated 5/3/2020



FINDINGS:

Abdomen: The no acute abnormality is seen in the lower chest.



The liver, spleen, pancreas, adrenal glands, and kidneys are unchanged in appearance. There is nephro
lithiasis on the right. There is no obstruction or inflammation. Large ventral hernia and eventration
 of the abdominal wall are again noted and appear unchanged from the recent CT.



Pelvis: There is no obstruction or inflammation. There are no abnormal fluid collections.





On review of bone windows, no acute osseous abnormalities are seen.





IMPRESSION:

1. There is eventration of the anterior abdominal wall with a large ventral hernia containing bowel. 
This appears unchanged from 5/3/2020. There is no obstruction. There is no free air.



Signer Name: Marcus Wayne MD 

Signed: 5/17/2020 3:04 AM

Workstation Name: [id number]

## 2020-05-27 ENCOUNTER — HOSPITAL ENCOUNTER (EMERGENCY)
Dept: HOSPITAL 5 - ED | Age: 45
LOS: 1 days | Discharge: HOME | End: 2020-05-28
Payer: SELF-PAY
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
## 2020-05-27 DIAGNOSIS — S09.90XA: Primary | ICD-10-CM

## 2020-05-27 DIAGNOSIS — Z98.890: ICD-10-CM
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
## 2020-05-27 DIAGNOSIS — F12.90: ICD-10-CM

## 2020-05-27 DIAGNOSIS — Z90.49: ICD-10-CM
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
## 2020-05-27 DIAGNOSIS — Z88.6: ICD-10-CM

## 2020-05-27 DIAGNOSIS — Z88.8: ICD-10-CM

## 2020-05-27 DIAGNOSIS — Z79.899: ICD-10-CM

## 2020-05-27 DIAGNOSIS — E66.9: ICD-10-CM

## 2020-05-27 DIAGNOSIS — J44.9: ICD-10-CM

## 2020-05-27 PROCEDURE — 70450 CT HEAD/BRAIN W/O DYE: CPT

## 2020-05-28 VITALS — SYSTOLIC BLOOD PRESSURE: 146 MMHG | DIASTOLIC BLOOD PRESSURE: 87 MMHG

## 2020-05-28 NOTE — CAT SCAN REPORT
Examination: CT of the head without contrast



Clinical information: Head trauma. Food can fell from shelf and hit patient on the head.



Comparison: No relevant prior studies are available for comparison.



Technical: Multiple axial CT images of the head were obtained without intravenous contrast.  Sagittal
 and coronal reformats were obtained.  All CTs at this facility utilize dose reduction techniques inc
luding automated exposure control, iterative reconstruction and weight based dosing when appropriate 
to reduce patient radiation dose to as low as reasonable achievable.



Findings: There is no CT evidence of acute intracranial hemorrhage or large territorial infarct area 
the ventricular system appears normal in size. No abnormal extra-axial fluid collections are identifi
ed.



Evaluation of bony structures demonstrates no evidence of acute bony abnormality. There is partial mu
cosal thickening of the ethmoid air cells and right maxillary sinus.



Impression:

1.  No CT evidence of acute intracranial process.



Signer Name: Cecy Koroma MD 

Signed: 5/28/2020 12:15 AM

Workstation Name: [id number]

## 2020-05-28 NOTE — EMERGENCY DEPARTMENT REPORT
ED Head Trauma HPI





- General


Chief complaint: Head Injury


Stated complaint: HEAD PAIN, CAN FELL OFF SHELF


Time Seen by Provider: 20 00:29


Source: patient


Mode of arrival: Ambulatory


Limitations: No Limitations





- History of Present Illness


Initial comments: 





45-year-old -American female presents to the emergency room stating that 

I can fell from her cabinet and hit her on the her forehead.  Patient denies any

loss of consciousness but states that she feels lightheaded and dizzy.  Patient 

has a past medical history of COPD arthritis obesity.


MD Complaint: head injury


Location: frontal


Loss of Consciousness: unsure


Previous Trauma to this Area: No


Place: home


Radiation: none


Severity: severe


Severity scale (0 -10): 10


Quality: aching


Consistency: constant


Associated Symptoms: vision changes.  denies: nausea, vomiting, vertigo, 

syncope, numbness, weakness





- Related Data


                                Home Medications











 Medication  Instructions  Recorded  Confirmed  Last Taken


 


ALPRAZolam [Xanax] 1 mg PO BID 18 Unknown


 


ARIPiprazole [Abilify] 5 mg PO BID 18 Unknown


 


Furosemide [Lasix TAB] 20 mg PO QDAY 18 Unknown


 


Omeprazole 40 mg PO QAM 18 Unknown


 


buPROPion XL [Wellbutrin XL] 150 mg PO QAM 18 Unknown








                                  Previous Rx's











 Medication  Instructions  Recorded  Last Taken  Type


 


Fluticasone/Salmeterol [Advair 1 puff IH BID #1 disk.w.dev 01/15/17 Unknown Rx





Diskus 250-50 mcg]    


 


HYDROcodone/APAP  [Norco 1 each PO Q8HR PRN #20 tablet 18 Unknown Rx





 mg TAB]    


 


HYDROcodone/APAP 5-325 [Norco 1 each PO Q6H PRN #10 tablet 18 Unknown Rx





5-325 mg TAB]    


 


Clindamycin [Clindamycin CAP] 150 mg PO Q6HR #40 capsule 20 Unknown Rx


 


Tobramycin [Tobrex] 1 drop OP Q4H #1 bottle 20 Unknown Rx


 


Ondansetron [Zofran Odt] 4 mg PO Q8HR PRN #20 tab.rapdis 20 Unknown Rx


 


oxyCODONE /ACETAMINOPHEN [Percocet 1 tab PO Q6HR PRN #7 tablet 20 Unknown 

Rx





5/325]    


 


Fluconazole (Nf) [Diflucan TAB] 150 mg PO ONCE #1 tablet 20 Unknown Rx


 


Ondansetron [Zofran Odt] 4 mg PO Q8HR PRN #20 tab.rapdis 20 Unknown Rx


 


Oxycodone HCl/Acetaminophen 1 each PO Q6HR PRN #15 tablet 20 Unknown Rx





[Percocet 10/325 mg]    











Allergies/Adverse reactions: 


                                    Allergies











Allergy/AdvReac Type Severity Reaction Status Date / Time


 


ibuprofen Allergy  Shortness Verified 20 18:08





   of Breath  


 


methylprednisolone Allergy  Hives Verified 18 11:45





[From Solu-Medrol]     


 


methylprednisolone sodium Allergy  Hives Verified 16 07:55





succinate     





[From Solu-Medrol]     


 


moxifloxacin [From Avelox] Allergy  Hives Verified 18 11:45


 


moxifloxacin HCl Allergy  Hives Verified 16 07:55





[From Avelox]     


 


sulfamethoxazole Allergy  Shortness Verified 20 18:08





[From Bactrim]   of Breath  


 


trimethoprim [From Bactrim] Allergy  Shortness Verified 20 18:08





   of Breath  














ED Review of Systems


ROS: 


Stated complaint: HEAD PAIN, CAN FELL OFF SHELF


Other details as noted in HPI








ED Past Medical Hx





- Past Medical History


Previous Medical History?: Yes


Hx Hypertension: No


Hx Congestive Heart Failure: No


Hx Diabetes: No


Hx Sickle Cell Disease: No


Hx Arthritis:  (intubated x 4)


Hx Seizures: No


Hx Asthma: Yes


Hx COPD: Yes (intubated x 4)


Hx HIV: No


Additional medical history: OBESITY.  HERNIA X 2.  ROMANA





- Surgical History


Past Surgical History?: Yes


Hx Cholecystectomy: Yes


Additional Surgical History: hernia repair .  





- Social History


Smoking Status: Never Smoker


Substance Use Type: Alcohol, Marijuana





- Medications


Home Medications: 


                                Home Medications











 Medication  Instructions  Recorded  Confirmed  Last Taken  Type


 


Fluticasone/Salmeterol [Advair 1 puff IH BID #1 disk.w.dev 01/15/17 04/08/18 

Unknown Rx





Diskus 250-50 mcg]     


 


ALPRAZolam [Xanax] 1 mg PO BID 18 Unknown History


 


ARIPiprazole [Abilify] 5 mg PO BID 18 Unknown History


 


Furosemide [Lasix TAB] 20 mg PO QDAY 18 Unknown History


 


Omeprazole 40 mg PO QAM 18 Unknown History


 


buPROPion XL [Wellbutrin XL] 150 mg PO QAM 18 Unknown History


 


HYDROcodone/APAP  [Norco 1 each PO Q8HR PRN #20 tablet 18  Unknown 

Rx





 mg TAB]     


 


HYDROcodone/APAP 5-325 [Norco 1 each PO Q6H PRN #10 tablet 18  Unknown Rx





5-325 mg TAB]     


 


Clindamycin [Clindamycin CAP] 150 mg PO Q6HR #40 capsule 20  Unknown Rx


 


Tobramycin [Tobrex] 1 drop OP Q4H #1 bottle 20  Unknown Rx


 


Ondansetron [Zofran Odt] 4 mg PO Q8HR PRN #20 tab.rapdis 20  Unknown Rx


 


oxyCODONE /ACETAMINOPHEN [Percocet 1 tab PO Q6HR PRN #7 tablet 20  Unknown

 Rx





5/325]     


 


Fluconazole (Nf) [Diflucan TAB] 150 mg PO ONCE #1 tablet 20  Unknown Rx


 


Ondansetron [Zofran Odt] 4 mg PO Q8HR PRN #20 tab.rapdis 20  Unknown Rx


 


Oxycodone HCl/Acetaminophen 1 each PO Q6HR PRN #15 tablet 20  Unknown Rx





[Percocet 10/325 mg]     














ED Physical Exam





- General


Limitations: No Limitations


General appearance: alert, in no apparent distress





- Head


Head exam: Present: atraumatic, normocephalic





- Eye


Eye exam: Present: normal appearance





- ENT


ENT exam: Present: mucous membranes moist





- Neck


Neck exam: Present: normal inspection, full ROM





- Respiratory


Respiratory exam: Present: normal lung sounds bilaterally.  Absent: respiratory 

distress





- Cardiovascular


Cardiovascular Exam: Present: regular rate, normal rhythm.  Absent: systolic 

murmur, diastolic murmur, rubs, gallop





- Neurological Exam


Neurological exam: Present: alert, oriented X3





- Expanded Neurological Exam


  ** Expanded


Cranial nerves: EOM's Intact: Normal, Gag Reflex: Normal, Tongue Deviation: 

Normal, Nystagmus: Normal, Facial Sensation: Normal, Facial Palsy with Forehead 

Movement: Normal, Facial Palsy without Forehead Movement: Normal


Cerebellar function: Finger to Nose: Normal, Heel to Shin: Normal, Romberg: 

Normal


Upper motor neuron: Rohit Neglect: Normal, Pronator Drift: Normal, Sensory 

Extinction: Normal


Sensory exam: Upper Extremity Light Touch: Normal, Upper Extremity Pin Prick: 

Normal, Upper Extremity Temperature: Normal, UE 2 Point Discrimination: Normal, 

Lower Extremity Light Touch: Normal, Lower Extremity Pin Prick: Normal, Lower 

Extremity Temperature: Normal, LE 2 Point Discrimination: Normal


Motor strength exam: RUE: 4, LUE: 4, RLE: 4, LLE: 4


Best Eye Response (Robina): (4) open spontaneously


Best Motor Response (Robina): (6) obeys commands


Best Verbal Response ([place]): (5) oriented


Robina Total: 15





- Psychiatric


Psychiatric exam: Present: normal affect, normal mood





- Skin


Skin exam: Present: warm, dry, intact, normal color.  Absent: rash





ED Course


                                   Vital Signs











  20





  23:24


 


Temperature 99.6 F


 


Pulse Rate 93 H


 


Respiratory 18





Rate 


 


Blood Pressure 144/93


 


O2 Sat by Pulse 96





Oximetry 














- Radiology Data


Radiology results: report reviewed





Referring Physician:COURT CEDILLOPatient Name:ALFONSO SMITHPatient 

ID:[id number] of Birth:[id number]:FemaleAccession:[id number] 

Date:[id number] Status:Finalized


Findings





[facility] 


[address] [place] [address] 





Cat Scan Report 


Signed 





 Patient: ALFONSO SMITH MR#: [id number] 


 [id number] 


 : 1975 Acct:[id number] 





 Age/Sex: 45 / F ADM Date: 20 





 Loc: ED 


 Attending Dr: 








 Ordering Physician: COURT CEDILLO MD 


 Date of Service: 20 


 Procedure(s): CT head/brain wo con 


 Accession Number(s): [id number] 





 cc: ED MD MAMADOU 








 Examination: CT of the head without contrast 





Clinical information: Head trauma. Food can fell from shelf and hit patient on 

the head. 





Comparison: No relevant prior studies are available for comparison. 





Technical: Multiple axial CT images of the head were obtained without 

intravenous contrast. 


 Sagittal and coronal reformats were obtained. All CTs at this facility utilize 

dose reduction 


 techniques including automated exposure control, iterative reconstruction and 

weight based dosing 


 when appropriate to reduce patient radiation dose to as low as reasonable 

achievable. 





Findings: There is no CT evidence of acute intracranial hemorrhage or large 

territorial infarct 


 area the ventricular system appears normal in size. No abnormal extra-axial 

fluid collections are 


 identified. 





Evaluation of bony structures demonstrates no evidence of acute bony 

abnormality. There is partial 


 mucosal thickening of the ethmoid air cells and right maxillary sinus. 





Impression: 


1. No CT evidence of acute intracranial process. 





Signer Name: Cecy Koroma MD 


Signed: 2020 12:15 AM 


Workstation Name: [id number] 








 Transcribed By: MATI 


 Dictated By: Cecy Koroma MD 


 Electronically Authenticated By: Cecy Koroma MD 


 Signed Date/Time: 20 0015 











 DD/DT: 20 001 


 TD/TT: 





- Medical Decision Making





45-year-old -American female presents to the emergency room stating that 

I can fell from her cabinet and hit her on the her forehead.  Patient denies any

loss of consciousness but states that she feels lightheaded and dizzy.  Patient 

has a past medical history of COPD arthritis obesity.











CT of head is negative for any acute abnormalities.  Patient reports that she 

has pain offered her Tylenol she says that she took that prior to arrival.  

Patient be discharged home instructed to use her chronic medications and take 

Tylenol for pain.


Critical care attestation.: 


If time is entered above; I have spent that time in minutes in the direct care 

of this critically ill patient, excluding procedure time.








ED Disposition


Clinical Impression: 


 Mild closed head injury





Disposition: DC-01 TO HOME OR SELFCARE


Is pt being admited?: No


Does the pt Need Aspirin: No


Condition: Stable


Instructions:  Minor Head Injury (ED)


Additional Instructions: 


CT of your head is negative.  Continue with your chronic medications Tylenol for

pain management.


Referrals: 


PRIMARY CARE,MD [Primary Care Provider] - 3-5 Days


VALENCIA SHAH MD [Staff Physician] - 3-5 Days

## 2020-06-13 ENCOUNTER — HOSPITAL ENCOUNTER (EMERGENCY)
Dept: HOSPITAL 5 - ED | Age: 45
Discharge: HOME | End: 2020-06-13
Payer: SELF-PAY

## 2020-06-13 VITALS — SYSTOLIC BLOOD PRESSURE: 157 MMHG | DIASTOLIC BLOOD PRESSURE: 79 MMHG

## 2020-06-13 DIAGNOSIS — Z79.899: ICD-10-CM

## 2020-06-13 DIAGNOSIS — E66.9: ICD-10-CM

## 2020-06-13 DIAGNOSIS — F12.90: ICD-10-CM

## 2020-06-13 DIAGNOSIS — J44.1: Primary | ICD-10-CM

## 2020-06-13 DIAGNOSIS — Z98.890: ICD-10-CM

## 2020-06-13 DIAGNOSIS — Z88.8: ICD-10-CM

## 2020-06-13 DIAGNOSIS — Z88.6: ICD-10-CM

## 2020-06-13 PROCEDURE — 94644 CONT INHLJ TX 1ST HOUR: CPT

## 2020-06-13 PROCEDURE — 71045 X-RAY EXAM CHEST 1 VIEW: CPT

## 2020-06-13 PROCEDURE — 96375 TX/PRO/DX INJ NEW DRUG ADDON: CPT

## 2020-06-13 PROCEDURE — 96365 THER/PROPH/DIAG IV INF INIT: CPT

## 2020-06-13 PROCEDURE — 99284 EMERGENCY DEPT VISIT MOD MDM: CPT

## 2020-06-13 NOTE — EMERGENCY DEPARTMENT REPORT
ED Shortness of Breath HPI





- General


Chief Complaint: Dyspnea/Respdistress


Stated Complaint: PEGGY


Time Seen by Provider: 20 00:27


Source: EMS


Mode of arrival: Stretcher


Limitations: No Limitations





- History of Present Illness


Initial Comments: 





46 yo F w/ COPD on 2L O2 at home, presents to ED with SOB, wheezing x 3 days.  

Pt reports cough, congestion, body aches.  Denies loss of smell or taste.  No 

known contact w/ anyone who has tested positive for COVID-19.


MD Complaint: shortness of breath


-: days(s) (3)


Consistency: constant


Improves With: nothing, bronchodilators


Worsens With: exertion


Known History Of: COPD


Associated Symptoms: cough


Treatments Prior to Arrival: bronchodilator





- Related Data


Home Oxygen Therapy: Yes


Home Oxygen Amount: 2 Liters


                                Home Medications











 Medication  Instructions  Recorded  Confirmed  Last Taken


 


ALPRAZolam [Xanax] 1 mg PO BID 18 Unknown


 


ARIPiprazole [Abilify] 5 mg PO BID 18 Unknown


 


Furosemide [Lasix TAB] 20 mg PO QDAY 18 Unknown


 


Omeprazole 40 mg PO QAM 18 Unknown


 


buPROPion XL [Wellbutrin XL] 150 mg PO QAM 18 Unknown








                                  Previous Rx's











 Medication  Instructions  Recorded  Last Taken  Type


 


Fluticasone/Salmeterol [Advair 1 puff IH BID #1 disk.w.dev 01/15/17 Unknown Rx





Diskus 250-50 mcg]    


 


HYDROcodone/APAP  [Norco 1 each PO Q8HR PRN #20 tablet 18 Unknown Rx





 mg TAB]    


 


HYDROcodone/APAP 5-325 [Norco 1 each PO Q6H PRN #10 tablet 18 Unknown Rx





5-325 mg TAB]    


 


Clindamycin [Clindamycin CAP] 150 mg PO Q6HR #40 capsule 20 Unknown Rx


 


Tobramycin [Tobrex] 1 drop OP Q4H #1 bottle 20 Unknown Rx


 


Ondansetron [Zofran Odt] 4 mg PO Q8HR PRN #20 tab.rapdis 20 Unknown Rx


 


oxyCODONE /ACETAMINOPHEN [Percocet 1 tab PO Q6HR PRN #7 tablet 20 Unknown 

Rx





5/325]    


 


Fluconazole (Nf) [Diflucan TAB] 150 mg PO ONCE #1 tablet 20 Unknown Rx


 


Ondansetron [Zofran Odt] 4 mg PO Q8HR PRN #20 tab.rapdis 20 Unknown Rx


 


Oxycodone HCl/Acetaminophen 1 each PO Q6HR PRN #15 tablet 20 Unknown Rx





[Percocet 10/325 mg]    


 


Benzonatate [Tessalon Perles] 100 mg PO Q8HR PRN #20 capsule 20 Unknown Rx


 


traMADoL [Ultram] 50 mg PO Q6HR PRN #7 tablet 20 Unknown Rx











                                    Allergies











Allergy/AdvReac Type Severity Reaction Status Date / Time


 


ibuprofen Allergy  Shortness Verified 20 18:08





   of Breath  


 


methylprednisolone Allergy  Hives Verified 18 11:45





[From Solu-Medrol]     


 


methylprednisolone sodium Allergy  Hives Verified 16 07:55





succinate     





[From Solu-Medrol]     


 


moxifloxacin [From Avelox] Allergy  Hives Verified 18 11:45


 


moxifloxacin HCl Allergy  Hives Verified 16 07:55





[From Avelox]     


 


sulfamethoxazole Allergy  Shortness Verified 20 18:08





[From Bactrim]   of Breath  


 


trimethoprim [From Bactrim] Allergy  Shortness Verified 20 18:08





   of Breath  














ED Review of Systems


ROS: 


Stated complaint: PEGGY


Other details as noted in HPI





Comment: All other systems reviewed and negative


Constitutional: fever


Respiratory: cough, shortness of breath, wheezing


Musculoskeletal: myalgia





ED Past Medical Hx





- Past Medical History


Hx Hypertension: No


Hx Congestive Heart Failure: No


Hx Diabetes: No


Hx Sickle Cell Disease: No


Hx Arthritis:  (intubated x 4)


Hx Seizures: No


Hx Asthma: Yes


Hx COPD: Yes (intubated x 4)


Hx HIV: No


Additional medical history: OBESITY.  HERNIA X 2.  ROMANA





- Surgical History


Hx Cholecystectomy: Yes


Additional Surgical History: hernia repair .  





- Social History


Smoking Status: Never Smoker


Substance Use Type: Alcohol, Marijuana





- Medications


Home Medications: 


                                Home Medications











 Medication  Instructions  Recorded  Confirmed  Last Taken  Type


 


Fluticasone/Salmeterol [Advair 1 puff IH BID #1 disk.w.dev 01/15/17 04/08/18 

Unknown Rx





Diskus 250-50 mcg]     


 


ALPRAZolam [Xanax] 1 mg PO BID 18 Unknown History


 


ARIPiprazole [Abilify] 5 mg PO BID 18 Unknown History


 


Furosemide [Lasix TAB] 20 mg PO QDAY 18 Unknown History


 


Omeprazole 40 mg PO QAM 18 Unknown History


 


buPROPion XL [Wellbutrin XL] 150 mg PO QAM 18 Unknown History


 


HYDROcodone/APAP  [Norco 1 each PO Q8HR PRN #20 tablet 18  Unknown 

Rx





 mg TAB]     


 


HYDROcodone/APAP 5-325 [Norco 1 each PO Q6H PRN #10 tablet 18  Unknown Rx





5-325 mg TAB]     


 


Clindamycin [Clindamycin CAP] 150 mg PO Q6HR #40 capsule 20  Unknown Rx


 


Tobramycin [Tobrex] 1 drop OP Q4H #1 bottle 20  Unknown Rx


 


Ondansetron [Zofran Odt] 4 mg PO Q8HR PRN #20 tab.rapdis 20  Unknown Rx


 


oxyCODONE /ACETAMINOPHEN [Percocet 1 tab PO Q6HR PRN #7 tablet 20  Unknown

 Rx





5/325]     


 


Fluconazole (Nf) [Diflucan TAB] 150 mg PO ONCE #1 tablet 20  Unknown Rx


 


Ondansetron [Zofran Odt] 4 mg PO Q8HR PRN #20 tab.rapdis 20  Unknown Rx


 


Oxycodone HCl/Acetaminophen 1 each PO Q6HR PRN #15 tablet 20  Unknown Rx





[Percocet 10/325 mg]     


 


Benzonatate [Tessalon Perles] 100 mg PO Q8HR PRN #20 capsule 20  Unknown 

Rx


 


traMADoL [Ultram] 50 mg PO Q6HR PRN #7 tablet 20  Unknown Rx














ED Physical Exam





- General


Limitations: No Limitations


General appearance: alert





- Head


Head exam: Present: atraumatic, normocephalic





- Eye


Eye exam: Present: normal appearance





- ENT


ENT exam: Present: mucous membranes moist





- Respiratory


Respiratory exam: Present: wheezes





- Cardiovascular


Cardiovascular Exam: Present: regular rate, normal rhythm





- GI/Abdominal


GI/Abdominal exam: Present: soft.  Absent: distended, tenderness





- Extremities Exam


Extremities exam: Present: normal inspection





- Neurological Exam


Neurological exam: Present: alert, oriented X3





- Psychiatric


Psychiatric exam: Present: normal affect, normal mood





- Skin


Skin exam: Present: warm, dry, intact, normal color





ED Course


                                   Vital Signs











  20





  00:25 00:30 00:31


 


Temperature   100.1 F H


 


Pulse Rate   93 H


 


Pulse Rate [   





Anterior   





Bilateral   





Throughout]   


 


Respiratory 20 9 L 22





Rate   


 


Respiratory   





Rate [Anterior   





Bilateral   





Throughout]   


 


Blood Pressure   


 


Blood Pressure   182/102





[Right]   


 


O2 Sat by Pulse 98  98





Oximetry   














  20





  00:46 01:00 01:46


 


Temperature   


 


Pulse Rate 92 H  86


 


Pulse Rate [  91 H 





Anterior   





Bilateral   





Throughout]   


 


Respiratory 31 H  14





Rate   


 


Respiratory  22 





Rate [Anterior   





Bilateral   





Throughout]   


 


Blood Pressure   140/85


 


Blood Pressure   





[Right]   


 


O2 Sat by Pulse 96  97





Oximetry   














  20





  02:00 02:16 02:30


 


Temperature   


 


Pulse Rate 92 H 97 H 95 H


 


Pulse Rate [   





Anterior   





Bilateral   





Throughout]   


 


Respiratory 12 16 13





Rate   


 


Respiratory   





Rate [Anterior   





Bilateral   





Throughout]   


 


Blood Pressure 120/101 146/89 157/88


 


Blood Pressure   





[Right]   


 


O2 Sat by Pulse 98 98 99





Oximetry   














  20





  02:45


 


Temperature 


 


Pulse Rate 83


 


Pulse Rate [ 





Anterior 





Bilateral 





Throughout] 


 


Respiratory 21





Rate 


 


Respiratory 





Rate [Anterior 





Bilateral 





Throughout] 


 


Blood Pressure 157/79


 


Blood Pressure 





[Right] 


 


O2 Sat by Pulse 98





Oximetry 














ED Medical Decision Making





- Radiology Data


Radiology results: report reviewed, image reviewed





- Medical Decision Making





- CXR normal


- O2 sats normal on RA


- lung exam much improved following mag, decadron, albuterol/atrovent nebs


- pt feeling much better


- advised outpt COVID testing and self-quarantine


- will d/c home, return precautions given.





- Differential Diagnosis


COPD, pneumonia, COVID


Critical care attestation.: 


If time is entered above; I have spent that time in minutes in the direct care 

of this critically ill patient, excluding procedure time.








ED Disposition


Clinical Impression: 


 COPD exacerbation





Disposition: DC-01 TO HOME OR SELFCARE


Is pt being admited?: No


Condition: Stable


Instructions:  COVID-19, Chronic Obstructive Pulmonary Disease (ED)


Prescriptions: 


Benzonatate [Tessalon Perles] 100 mg PO Q8HR PRN #20 capsule


 PRN Reason: Cough


traMADoL [Ultram] 50 mg PO Q6HR PRN #7 tablet


 PRN Reason: Pain


Referrals: 


PRIMARY CARE,MD [Primary Care Provider] - 2-3 Days


[facility] [Provider Group] - 2-3 Days


Time of Disposition: 03:29

## 2020-06-13 NOTE — XRAY REPORT
CHEST 1 VIEW 6/13/2020 12:10 AM



INDICATION / CLINICAL INFORMATION:

sob.



COMPARISON: 

Chest x-ray 6/26/2018



FINDINGS:



SUPPORT DEVICES: None.



HEART / MEDIASTINUM: No significant abnormality. 



LUNGS / PLEURA: No significant pulmonary or pleural abnormality. No pneumothorax. 



ADDITIONAL FINDINGS: No significant additional findings.



IMPRESSION:

1. No acute findings.



Signer Name: Jarrett Jenkins MD 

Signed: 6/13/2020 1:12 AM

Workstation Name: [email or web address]